# Patient Record
Sex: MALE | Race: WHITE | NOT HISPANIC OR LATINO | Employment: OTHER | URBAN - METROPOLITAN AREA
[De-identification: names, ages, dates, MRNs, and addresses within clinical notes are randomized per-mention and may not be internally consistent; named-entity substitution may affect disease eponyms.]

---

## 2023-04-27 LAB
ALANINE AMINOTRANSFERASE (SGPT) (U/L) IN SER/PLAS: 12 U/L (ref 10–52)
ALBUMIN (G/DL) IN SER/PLAS: 4.4 G/DL (ref 3.4–5)
ALBUMIN (MG/L) IN URINE: 21 MG/L
ALBUMIN/CREATININE (UG/MG) IN URINE: 9.7 UG/MG CRT (ref 0–30)
ALKALINE PHOSPHATASE (U/L) IN SER/PLAS: 67 U/L (ref 33–136)
ANION GAP IN SER/PLAS: 12 MMOL/L (ref 10–20)
APPEARANCE, URINE: NORMAL
ASPARTATE AMINOTRANSFERASE (SGOT) (U/L) IN SER/PLAS: 26 U/L (ref 9–39)
BASOPHILS (10*3/UL) IN BLOOD BY AUTOMATED COUNT: 0.13 X10E9/L (ref 0–0.1)
BASOPHILS/100 LEUKOCYTES IN BLOOD BY AUTOMATED COUNT: 1.2 % (ref 0–2)
BILIRUBIN TOTAL (MG/DL) IN SER/PLAS: 0.5 MG/DL (ref 0–1.2)
BILIRUBIN, URINE: NEGATIVE
BLOOD, URINE: NEGATIVE
CALCIUM (MG/DL) IN SER/PLAS: 9.6 MG/DL (ref 8.6–10.3)
CARBON DIOXIDE, TOTAL (MMOL/L) IN SER/PLAS: 27 MMOL/L (ref 21–32)
CHLORIDE (MMOL/L) IN SER/PLAS: 106 MMOL/L (ref 98–107)
CHOLESTEROL (MG/DL) IN SER/PLAS: 109 MG/DL (ref 0–199)
CHOLESTEROL IN HDL (MG/DL) IN SER/PLAS: 27 MG/DL
CHOLESTEROL/HDL RATIO: 4
COLOR, URINE: YELLOW
CREATININE (MG/DL) IN SER/PLAS: 0.77 MG/DL (ref 0.5–1.3)
CREATININE (MG/DL) IN URINE: 216 MG/DL (ref 20–370)
EOSINOPHILS (10*3/UL) IN BLOOD BY AUTOMATED COUNT: 0.98 X10E9/L (ref 0–0.7)
EOSINOPHILS/100 LEUKOCYTES IN BLOOD BY AUTOMATED COUNT: 9.1 % (ref 0–6)
ERYTHROCYTE DISTRIBUTION WIDTH (RATIO) BY AUTOMATED COUNT: 13.6 % (ref 11.5–14.5)
ERYTHROCYTE MEAN CORPUSCULAR HEMOGLOBIN CONCENTRATION (G/DL) BY AUTOMATED: 32.3 G/DL (ref 32–36)
ERYTHROCYTE MEAN CORPUSCULAR VOLUME (FL) BY AUTOMATED COUNT: 103 FL (ref 80–100)
ERYTHROCYTES (10*6/UL) IN BLOOD BY AUTOMATED COUNT: 4.8 X10E12/L (ref 4.5–5.9)
ESTIMATED AVERAGE GLUCOSE FOR HBA1C: 126 MG/DL
GFR MALE: >90 ML/MIN/1.73M2
GLUCOSE (MG/DL) IN SER/PLAS: 89 MG/DL (ref 74–99)
GLUCOSE, URINE: NEGATIVE MG/DL
HEMATOCRIT (%) IN BLOOD BY AUTOMATED COUNT: 49.3 % (ref 41–52)
HEMOGLOBIN (G/DL) IN BLOOD: 15.9 G/DL (ref 13.5–17.5)
HEMOGLOBIN A1C/HEMOGLOBIN TOTAL IN BLOOD: 6 %
IMMATURE GRANULOCYTES/100 LEUKOCYTES IN BLOOD BY AUTOMATED COUNT: 0.2 % (ref 0–0.9)
KETONES, URINE: NEGATIVE MG/DL
LDL: 63 MG/DL (ref 0–99)
LEUKOCYTE ESTERASE, URINE: NEGATIVE
LEUKOCYTES (10*3/UL) IN BLOOD BY AUTOMATED COUNT: 10.8 X10E9/L (ref 4.4–11.3)
LYMPHOCYTES (10*3/UL) IN BLOOD BY AUTOMATED COUNT: 3.11 X10E9/L (ref 1.2–4.8)
LYMPHOCYTES/100 LEUKOCYTES IN BLOOD BY AUTOMATED COUNT: 28.9 % (ref 13–44)
MONOCYTES (10*3/UL) IN BLOOD BY AUTOMATED COUNT: 0.75 X10E9/L (ref 0.1–1)
MONOCYTES/100 LEUKOCYTES IN BLOOD BY AUTOMATED COUNT: 7 % (ref 2–10)
NEUTROPHILS (10*3/UL) IN BLOOD BY AUTOMATED COUNT: 5.76 X10E9/L (ref 1.2–7.7)
NEUTROPHILS/100 LEUKOCYTES IN BLOOD BY AUTOMATED COUNT: 53.6 % (ref 40–80)
NITRITE, URINE: NEGATIVE
NRBC (PER 100 WBCS) BY AUTOMATED COUNT: 0 /100 WBC (ref 0–0)
PH, URINE: 5 (ref 5–8)
PLATELETS (10*3/UL) IN BLOOD AUTOMATED COUNT: 283 X10E9/L (ref 150–450)
POTASSIUM (MMOL/L) IN SER/PLAS: 4.7 MMOL/L (ref 3.5–5.3)
PROTEIN TOTAL: 7.4 G/DL (ref 6.4–8.2)
PROTEIN, URINE: NEGATIVE MG/DL
SODIUM (MMOL/L) IN SER/PLAS: 140 MMOL/L (ref 136–145)
SPECIFIC GRAVITY, URINE: 1.02 (ref 1–1.03)
THYROTROPIN (MIU/L) IN SER/PLAS BY DETECTION LIMIT <= 0.05 MIU/L: 5.02 MIU/L (ref 0.44–3.98)
TRIGLYCERIDE (MG/DL) IN SER/PLAS: 94 MG/DL (ref 0–149)
UREA NITROGEN (MG/DL) IN SER/PLAS: 15 MG/DL (ref 6–23)
UROBILINOGEN, URINE: <2 MG/DL (ref 0–1.9)
VLDL: 19 MG/DL (ref 0–40)

## 2023-09-27 DIAGNOSIS — I25.5 ISCHEMIC CARDIOMYOPATHY: ICD-10-CM

## 2023-10-20 ENCOUNTER — APPOINTMENT (OUTPATIENT)
Dept: RADIOLOGY | Facility: HOSPITAL | Age: 63
End: 2023-10-20
Payer: MEDICARE

## 2023-10-20 ENCOUNTER — HOSPITAL ENCOUNTER (OUTPATIENT)
Facility: HOSPITAL | Age: 63
Setting detail: OBSERVATION
Discharge: HOME | End: 2023-10-22
Attending: EMERGENCY MEDICINE | Admitting: INTERNAL MEDICINE
Payer: MEDICARE

## 2023-10-20 ENCOUNTER — HOSPITAL ENCOUNTER (OUTPATIENT)
Dept: CARDIOLOGY | Facility: HOSPITAL | Age: 63
Discharge: HOME | End: 2023-10-20
Payer: MEDICARE

## 2023-10-20 DIAGNOSIS — R79.89 ELEVATED TROPONIN: Primary | ICD-10-CM

## 2023-10-20 DIAGNOSIS — R07.89 CHEST DISCOMFORT: ICD-10-CM

## 2023-10-20 PROBLEM — R07.9 CHEST PAIN: Status: ACTIVE | Noted: 2023-10-20

## 2023-10-20 LAB
ALBUMIN SERPL BCP-MCNC: 4.8 G/DL (ref 3.4–5)
ALP SERPL-CCNC: 83 U/L (ref 33–136)
ALT SERPL W P-5'-P-CCNC: 9 U/L (ref 10–52)
ANION GAP SERPL CALC-SCNC: 22 MMOL/L (ref 10–20)
APPEARANCE UR: ABNORMAL
AST SERPL W P-5'-P-CCNC: 37 U/L (ref 9–39)
BASOPHILS # BLD AUTO: 0.08 X10*3/UL (ref 0–0.1)
BASOPHILS NFR BLD AUTO: 0.8 %
BILIRUB SERPL-MCNC: 0.9 MG/DL (ref 0–1.2)
BILIRUB UR STRIP.AUTO-MCNC: NEGATIVE MG/DL
BUN SERPL-MCNC: 24 MG/DL (ref 6–23)
CALCIUM SERPL-MCNC: 10.1 MG/DL (ref 8.6–10.3)
CARDIAC TROPONIN I PNL SERPL HS: 24 NG/L (ref 0–20)
CARDIAC TROPONIN I PNL SERPL HS: 48 NG/L (ref 0–20)
CHLORIDE SERPL-SCNC: 104 MMOL/L (ref 98–107)
CO2 SERPL-SCNC: 18 MMOL/L (ref 21–32)
COLOR UR: YELLOW
CREAT SERPL-MCNC: 1.09 MG/DL (ref 0.5–1.3)
EOSINOPHIL # BLD AUTO: 0.06 X10*3/UL (ref 0–0.7)
EOSINOPHIL NFR BLD AUTO: 0.6 %
ERYTHROCYTE [DISTWIDTH] IN BLOOD BY AUTOMATED COUNT: 14 % (ref 11.5–14.5)
FLUAV RNA RESP QL NAA+PROBE: NOT DETECTED
FLUBV RNA RESP QL NAA+PROBE: NOT DETECTED
GFR SERPL CREATININE-BSD FRML MDRD: 75 ML/MIN/1.73M*2
GLUCOSE BLD MANUAL STRIP-MCNC: 145 MG/DL (ref 74–99)
GLUCOSE BLD MANUAL STRIP-MCNC: 155 MG/DL (ref 74–99)
GLUCOSE SERPL-MCNC: 125 MG/DL (ref 74–99)
GLUCOSE UR STRIP.AUTO-MCNC: ABNORMAL MG/DL
HCT VFR BLD AUTO: 48.2 % (ref 41–52)
HGB BLD-MCNC: 16.3 G/DL (ref 13.5–17.5)
HYALINE CASTS #/AREA URNS AUTO: ABNORMAL /LPF
IMM GRANULOCYTES # BLD AUTO: 0.04 X10*3/UL (ref 0–0.7)
IMM GRANULOCYTES NFR BLD AUTO: 0.4 % (ref 0–0.9)
KETONES UR STRIP.AUTO-MCNC: ABNORMAL MG/DL
LACTATE SERPL-SCNC: 2.8 MMOL/L (ref 0.4–2)
LACTATE SERPL-SCNC: 3.3 MMOL/L (ref 0.4–2)
LEUKOCYTE ESTERASE UR QL STRIP.AUTO: NEGATIVE
LIPASE SERPL-CCNC: 22 U/L (ref 9–82)
LYMPHOCYTES # BLD AUTO: 1.58 X10*3/UL (ref 1.2–4.8)
LYMPHOCYTES NFR BLD AUTO: 15.3 %
MAGNESIUM SERPL-MCNC: 1.76 MG/DL (ref 1.6–2.4)
MCH RBC QN AUTO: 32.1 PG (ref 26–34)
MCHC RBC AUTO-ENTMCNC: 33.8 G/DL (ref 32–36)
MCV RBC AUTO: 95 FL (ref 80–100)
MONOCYTES # BLD AUTO: 1.44 X10*3/UL (ref 0.1–1)
MONOCYTES NFR BLD AUTO: 14 %
MUCOUS THREADS #/AREA URNS AUTO: ABNORMAL /LPF
NEUTROPHILS # BLD AUTO: 7.1 X10*3/UL (ref 1.2–7.7)
NEUTROPHILS NFR BLD AUTO: 68.9 %
NITRITE UR QL STRIP.AUTO: NEGATIVE
NRBC BLD-RTO: 0 /100 WBCS (ref 0–0)
PH UR STRIP.AUTO: 5.5 [PH]
PLATELET # BLD AUTO: 278 X10*3/UL (ref 150–450)
PMV BLD AUTO: 9.6 FL (ref 7.5–11.5)
POTASSIUM SERPL-SCNC: 4.3 MMOL/L (ref 3.5–5.3)
PROT SERPL-MCNC: 8.1 G/DL (ref 6.4–8.2)
PROT UR STRIP.AUTO-MCNC: ABNORMAL MG/DL
RBC # BLD AUTO: 5.07 X10*6/UL (ref 4.5–5.9)
RBC # UR STRIP.AUTO: ABNORMAL /UL
RBC #/AREA URNS AUTO: ABNORMAL /HPF
SARS-COV-2 RNA RESP QL NAA+PROBE: NOT DETECTED
SODIUM SERPL-SCNC: 140 MMOL/L (ref 136–145)
SP GR UR STRIP.AUTO: 1.02
UROBILINOGEN UR STRIP.AUTO-MCNC: ABNORMAL MG/DL
WBC # BLD AUTO: 10.3 X10*3/UL (ref 4.4–11.3)
WBC #/AREA URNS AUTO: ABNORMAL /HPF

## 2023-10-20 PROCEDURE — 36415 COLL VENOUS BLD VENIPUNCTURE: CPT | Performed by: NURSE PRACTITIONER

## 2023-10-20 PROCEDURE — 87636 SARSCOV2 & INF A&B AMP PRB: CPT

## 2023-10-20 PROCEDURE — 2550000001 HC RX 255 CONTRASTS: Performed by: NURSE PRACTITIONER

## 2023-10-20 PROCEDURE — 83605 ASSAY OF LACTIC ACID: CPT | Performed by: NURSE PRACTITIONER

## 2023-10-20 PROCEDURE — 93005 ELECTROCARDIOGRAM TRACING: CPT

## 2023-10-20 PROCEDURE — 83690 ASSAY OF LIPASE: CPT | Performed by: NURSE PRACTITIONER

## 2023-10-20 PROCEDURE — 82947 ASSAY GLUCOSE BLOOD QUANT: CPT

## 2023-10-20 PROCEDURE — 83735 ASSAY OF MAGNESIUM: CPT | Performed by: NURSE PRACTITIONER

## 2023-10-20 PROCEDURE — 87075 CULTR BACTERIA EXCEPT BLOOD: CPT | Mod: CMCLAB,PARLAB,59 | Performed by: NURSE PRACTITIONER

## 2023-10-20 PROCEDURE — 74177 CT ABD & PELVIS W/CONTRAST: CPT | Mod: ME

## 2023-10-20 PROCEDURE — 80053 COMPREHEN METABOLIC PANEL: CPT | Performed by: NURSE PRACTITIONER

## 2023-10-20 PROCEDURE — 81001 URINALYSIS AUTO W/SCOPE: CPT | Performed by: NURSE PRACTITIONER

## 2023-10-20 PROCEDURE — 2500000001 HC RX 250 WO HCPCS SELF ADMINISTERED DRUGS (ALT 637 FOR MEDICARE OP)

## 2023-10-20 PROCEDURE — 71046 X-RAY EXAM CHEST 2 VIEWS: CPT | Mod: FOREIGN READ | Performed by: RADIOLOGY

## 2023-10-20 PROCEDURE — 87040 BLOOD CULTURE FOR BACTERIA: CPT | Mod: CMCLAB,PARLAB | Performed by: NURSE PRACTITIONER

## 2023-10-20 PROCEDURE — 2500000004 HC RX 250 GENERAL PHARMACY W/ HCPCS (ALT 636 FOR OP/ED)

## 2023-10-20 PROCEDURE — 87040 BLOOD CULTURE FOR BACTERIA: CPT | Mod: 59,CMCLAB,PARLAB | Performed by: NURSE PRACTITIONER

## 2023-10-20 PROCEDURE — G0378 HOSPITAL OBSERVATION PER HR: HCPCS

## 2023-10-20 PROCEDURE — 84484 ASSAY OF TROPONIN QUANT: CPT

## 2023-10-20 PROCEDURE — 85025 COMPLETE CBC W/AUTO DIFF WBC: CPT | Performed by: NURSE PRACTITIONER

## 2023-10-20 PROCEDURE — 74177 CT ABD & PELVIS W/CONTRAST: CPT | Mod: FOREIGN READ | Performed by: RADIOLOGY

## 2023-10-20 PROCEDURE — 99285 EMERGENCY DEPT VISIT HI MDM: CPT | Mod: 25 | Performed by: EMERGENCY MEDICINE

## 2023-10-20 PROCEDURE — 96360 HYDRATION IV INFUSION INIT: CPT

## 2023-10-20 PROCEDURE — 71046 X-RAY EXAM CHEST 2 VIEWS: CPT | Mod: FY,FR

## 2023-10-20 PROCEDURE — 84484 ASSAY OF TROPONIN QUANT: CPT | Mod: 59 | Performed by: NURSE PRACTITIONER

## 2023-10-20 RX ORDER — ACETAMINOPHEN 325 MG/1
650 TABLET ORAL ONCE
Status: COMPLETED | OUTPATIENT
Start: 2023-10-20 | End: 2023-10-20

## 2023-10-20 RX ORDER — ASPIRIN 325 MG
325 TABLET ORAL ONCE
Status: COMPLETED | OUTPATIENT
Start: 2023-10-20 | End: 2023-10-20

## 2023-10-20 RX ADMIN — IOHEXOL 75 ML: 350 INJECTION, SOLUTION INTRAVENOUS at 19:59

## 2023-10-20 RX ADMIN — ASPIRIN 325 MG ORAL TABLET 325 MG: 325 PILL ORAL at 23:04

## 2023-10-20 RX ADMIN — SODIUM CHLORIDE, POTASSIUM CHLORIDE, SODIUM LACTATE AND CALCIUM CHLORIDE 1000 ML: 600; 310; 30; 20 INJECTION, SOLUTION INTRAVENOUS at 21:15

## 2023-10-20 RX ADMIN — ACETAMINOPHEN 650 MG: 325 TABLET ORAL at 21:54

## 2023-10-20 ASSESSMENT — HEART SCORE
HISTORY: SLIGHTLY SUSPICIOUS
HEART SCORE: 5
RISK FACTORS: >2 RISK FACTORS OR HX OF ATHEROSCLEROTIC DISEASE
ECG: NORMAL
TROPONIN: 1-3 TIMES NORMAL LIMIT
AGE: 65+

## 2023-10-20 ASSESSMENT — COLUMBIA-SUICIDE SEVERITY RATING SCALE - C-SSRS
2. HAVE YOU ACTUALLY HAD ANY THOUGHTS OF KILLING YOURSELF?: NO
6. HAVE YOU EVER DONE ANYTHING, STARTED TO DO ANYTHING, OR PREPARED TO DO ANYTHING TO END YOUR LIFE?: NO
1. IN THE PAST MONTH, HAVE YOU WISHED YOU WERE DEAD OR WISHED YOU COULD GO TO SLEEP AND NOT WAKE UP?: NO

## 2023-10-20 NOTE — ED TRIAGE NOTES
Pt wife requested repeat vitals on patient. Pt shaking uncontrollably stating he needs a room asap. RN informed patient and wife that there are physically no available beds in the department at this time. RN informed charge nurse.

## 2023-10-20 NOTE — ED TRIAGE NOTES
PIT Note    Secondary to patient volumes and overcrowding, I performed a brief medical screening exam of the patient in triage, as the patient awaits space in the main ED.    History of Present Illness: Max Rivera is a 63-year-old  male with history of TIA, coronary artery disease with prior MI and CABG, diabetes and anxiety who presents to ED today from home with wife for evaluation of central abdominal pain and nausea/vomiting x3 days, unable to keep anything down.  No medication taken prior to arrival.  Sharp stabbing central abdominal pain is rated 7/10, nothing palliative provokes his symptoms.  Unable to take any of his medications due to nausea/vomiting.  Wife reports intermittent fevers.  No sick contacts, tainted food or recent travel.  No previous abdominal surgeries.  Denies cough/cold symptoms, chest pain, shortness of breath, dysuria/hematuria, change in bowel habits or any other complaints.  Former smoker, no EtOH or drug use.  PCP is Dr. Kamran Tejada, cardiologist is Dr. Benitez.     Physical Exam:  General -older  male, sitting in a wheelchair shaking, complaining of nausea, nontoxic looking but appears unwell.  Alert and oriented x3.  Cooperative.  Lips dry.  Thin.  Respiratory - Breathing comfortably  Cardiac -regular rate and rhythm.  Abdominal -abdomen is flat and soft with bowel sounds, diffusely tender.  Neurologic -movement of extremities x4.  No focal neurological deficit.  Skin -Pink warm and dry.      Medical Decision Makin-year-old  male evaluated the bedside for 3 days of nausea/vomiting and abdominal pain.  On arrival to the ED, blood sugar 145.  Blood pressure 119/63 with a heart rate of 88, afebrile now, O2 sat 98%.  Abdomen is generally tender.  Remains NPO.  IV established, basic labs, UA/urine culture, EKG, chest x-ray and CT abdomen/pelvis with contrast will be performed in preparation for further evaluation in the main ED.  Patient and wife  agreeable to this plan.      The patient demonstrates understanding that this initial evaluation is a brief medical screening exam and the expectation is that they await for space in the main ED to be further evaluated.  The patient understands that, if they leave prior to further evaluation in the main ED after this initial evaluation in triage, they are doing so under their own accord knowing that their evaluation/work-up is not yet complete. The patient also understands that any preliminary diagnostic results, including abnormalities, may not be shared with them, if they choose to leave prior to further evaluation in the main ED.

## 2023-10-20 NOTE — ED TRIAGE NOTES
Pt presents to ED from home for reports of chest congestion, weakness, shakiness, and cough x3 days. Pt denies diarrhea, CP, SOB. Pt reports vomiting.

## 2023-10-21 LAB
ALBUMIN SERPL BCP-MCNC: 4.2 G/DL (ref 3.4–5)
ALP SERPL-CCNC: 65 U/L (ref 33–136)
ALT SERPL W P-5'-P-CCNC: 8 U/L (ref 10–52)
ANION GAP SERPL CALC-SCNC: 13 MMOL/L (ref 10–20)
AST SERPL W P-5'-P-CCNC: 73 U/L (ref 9–39)
BILIRUB SERPL-MCNC: 0.6 MG/DL (ref 0–1.2)
BUN SERPL-MCNC: 21 MG/DL (ref 6–23)
CALCIUM SERPL-MCNC: 9.1 MG/DL (ref 8.6–10.3)
CARDIAC TROPONIN I PNL SERPL HS: 85 NG/L (ref 0–20)
CARDIAC TROPONIN I PNL SERPL HS: 86 NG/L (ref 0–20)
CHLORIDE SERPL-SCNC: 110 MMOL/L (ref 98–107)
CO2 SERPL-SCNC: 24 MMOL/L (ref 21–32)
CREAT SERPL-MCNC: 0.9 MG/DL (ref 0.5–1.3)
GFR SERPL CREATININE-BSD FRML MDRD: >90 ML/MIN/1.73M*2
GLUCOSE SERPL-MCNC: 98 MG/DL (ref 74–99)
HOLD SPECIMEN: NORMAL
POTASSIUM SERPL-SCNC: 3.8 MMOL/L (ref 3.5–5.3)
PROT SERPL-MCNC: 7.4 G/DL (ref 6.4–8.2)
SODIUM SERPL-SCNC: 143 MMOL/L (ref 136–145)

## 2023-10-21 PROCEDURE — 80053 COMPREHEN METABOLIC PANEL: CPT | Performed by: INTERNAL MEDICINE

## 2023-10-21 PROCEDURE — 2500000002 HC RX 250 W HCPCS SELF ADMINISTERED DRUGS (ALT 637 FOR MEDICARE OP, ALT 636 FOR OP/ED): Mod: MUE | Performed by: INTERNAL MEDICINE

## 2023-10-21 PROCEDURE — 2500000004 HC RX 250 GENERAL PHARMACY W/ HCPCS (ALT 636 FOR OP/ED): Mod: MUE | Performed by: INTERNAL MEDICINE

## 2023-10-21 PROCEDURE — 36415 COLL VENOUS BLD VENIPUNCTURE: CPT | Performed by: INTERNAL MEDICINE

## 2023-10-21 PROCEDURE — G0378 HOSPITAL OBSERVATION PER HR: HCPCS

## 2023-10-21 PROCEDURE — 84484 ASSAY OF TROPONIN QUANT: CPT | Mod: 59 | Performed by: INTERNAL MEDICINE

## 2023-10-21 PROCEDURE — 96372 THER/PROPH/DIAG INJ SC/IM: CPT | Performed by: INTERNAL MEDICINE

## 2023-10-21 PROCEDURE — 2500000001 HC RX 250 WO HCPCS SELF ADMINISTERED DRUGS (ALT 637 FOR MEDICARE OP): Performed by: INTERNAL MEDICINE

## 2023-10-21 PROCEDURE — 99222 1ST HOSP IP/OBS MODERATE 55: CPT | Performed by: INTERNAL MEDICINE

## 2023-10-21 RX ORDER — GABAPENTIN 400 MG/1
400 CAPSULE ORAL 3 TIMES DAILY
COMMUNITY

## 2023-10-21 RX ORDER — SERTRALINE HYDROCHLORIDE 100 MG/1
100 TABLET, FILM COATED ORAL DAILY
Status: DISCONTINUED | OUTPATIENT
Start: 2023-10-21 | End: 2023-10-22 | Stop reason: HOSPADM

## 2023-10-21 RX ORDER — ATORVASTATIN CALCIUM 80 MG/1
80 TABLET, FILM COATED ORAL DAILY
COMMUNITY
End: 2024-05-28

## 2023-10-21 RX ORDER — TIZANIDINE 4 MG/1
2 TABLET ORAL EVERY 8 HOURS PRN
Status: DISCONTINUED | OUTPATIENT
Start: 2023-10-21 | End: 2023-10-22 | Stop reason: HOSPADM

## 2023-10-21 RX ORDER — SERTRALINE HYDROCHLORIDE 100 MG/1
1 TABLET, FILM COATED ORAL DAILY
COMMUNITY
Start: 2023-08-29

## 2023-10-21 RX ORDER — ATORVASTATIN CALCIUM 80 MG/1
80 TABLET, FILM COATED ORAL DAILY
Status: DISCONTINUED | OUTPATIENT
Start: 2023-10-21 | End: 2023-10-22 | Stop reason: HOSPADM

## 2023-10-21 RX ORDER — EZETIMIBE 10 MG/1
10 TABLET ORAL DAILY
COMMUNITY

## 2023-10-21 RX ORDER — VIT C/E/ZN/COPPR/LUTEIN/ZEAXAN 250MG-90MG
25 CAPSULE ORAL DAILY
COMMUNITY
Start: 2022-05-31

## 2023-10-21 RX ORDER — CARVEDILOL 3.12 MG/1
3.12 TABLET ORAL
COMMUNITY
Start: 2017-03-20

## 2023-10-21 RX ORDER — ASPIRIN 81 MG/1
1 TABLET ORAL DAILY
COMMUNITY

## 2023-10-21 RX ORDER — CARVEDILOL 3.12 MG/1
3.12 TABLET ORAL
Status: DISCONTINUED | OUTPATIENT
Start: 2023-10-22 | End: 2023-10-22 | Stop reason: HOSPADM

## 2023-10-21 RX ORDER — VIT C/E/ZN/COPPR/LUTEIN/ZEAXAN 250MG-90MG
25 CAPSULE ORAL DAILY
Status: DISCONTINUED | OUTPATIENT
Start: 2023-10-22 | End: 2023-10-22

## 2023-10-21 RX ORDER — LORATADINE 10 MG/1
10 TABLET ORAL DAILY
Status: DISCONTINUED | OUTPATIENT
Start: 2023-10-21 | End: 2023-10-22 | Stop reason: HOSPADM

## 2023-10-21 RX ORDER — ASPIRIN 81 MG/1
81 TABLET ORAL DAILY
Status: DISCONTINUED | OUTPATIENT
Start: 2023-10-21 | End: 2023-10-22 | Stop reason: HOSPADM

## 2023-10-21 RX ORDER — EZETIMIBE 10 MG/1
10 TABLET ORAL DAILY
Status: DISCONTINUED | OUTPATIENT
Start: 2023-10-21 | End: 2023-10-22 | Stop reason: HOSPADM

## 2023-10-21 RX ORDER — ENOXAPARIN SODIUM 100 MG/ML
40 INJECTION SUBCUTANEOUS EVERY 24 HOURS
Status: DISCONTINUED | OUTPATIENT
Start: 2023-10-21 | End: 2023-10-22 | Stop reason: HOSPADM

## 2023-10-21 RX ORDER — GABAPENTIN 400 MG/1
400 CAPSULE ORAL 3 TIMES DAILY
Status: DISCONTINUED | OUTPATIENT
Start: 2023-10-21 | End: 2023-10-22 | Stop reason: HOSPADM

## 2023-10-21 RX ORDER — TIZANIDINE 2 MG/1
2 TABLET ORAL EVERY 8 HOURS PRN
COMMUNITY
Start: 2023-08-29

## 2023-10-21 RX ADMIN — ATORVASTATIN CALCIUM 80 MG: 80 TABLET, FILM COATED ORAL at 19:46

## 2023-10-21 RX ADMIN — EZETIMIBE 10 MG: 10 TABLET ORAL at 19:46

## 2023-10-21 RX ADMIN — LORATADINE 10 MG: 10 TABLET ORAL at 20:46

## 2023-10-21 RX ADMIN — TIZANIDINE 2 MG: 4 TABLET ORAL at 19:46

## 2023-10-21 RX ADMIN — EMPAGLIFLOZIN 10 MG: 10 TABLET, FILM COATED ORAL at 20:46

## 2023-10-21 RX ADMIN — ENOXAPARIN SODIUM 40 MG: 40 INJECTION SUBCUTANEOUS at 20:46

## 2023-10-21 RX ADMIN — ASPIRIN 81 MG: 81 TABLET, COATED ORAL at 19:46

## 2023-10-21 RX ADMIN — SERTRALINE HYDROCHLORIDE 100 MG: 100 TABLET ORAL at 19:46

## 2023-10-21 RX ADMIN — GABAPENTIN 400 MG: 400 CAPSULE ORAL at 20:51

## 2023-10-21 SDOH — SOCIAL STABILITY: SOCIAL INSECURITY: HAS ANYONE EVER THREATENED TO HURT YOUR FAMILY OR YOUR PETS?: NO

## 2023-10-21 SDOH — SOCIAL STABILITY: SOCIAL INSECURITY: ARE YOU OR HAVE YOU BEEN THREATENED OR ABUSED PHYSICALLY, EMOTIONALLY, OR SEXUALLY BY ANYONE?: NO

## 2023-10-21 SDOH — SOCIAL STABILITY: SOCIAL INSECURITY: HAVE YOU HAD THOUGHTS OF HARMING ANYONE ELSE?: NO

## 2023-10-21 SDOH — SOCIAL STABILITY: SOCIAL INSECURITY: ABUSE: ADULT

## 2023-10-21 SDOH — SOCIAL STABILITY: SOCIAL INSECURITY: DOES ANYONE TRY TO KEEP YOU FROM HAVING/CONTACTING OTHER FRIENDS OR DOING THINGS OUTSIDE YOUR HOME?: NO

## 2023-10-21 SDOH — SOCIAL STABILITY: SOCIAL INSECURITY: DO YOU FEEL UNSAFE GOING BACK TO THE PLACE WHERE YOU ARE LIVING?: NO

## 2023-10-21 SDOH — SOCIAL STABILITY: SOCIAL INSECURITY: DO YOU FEEL ANYONE HAS EXPLOITED OR TAKEN ADVANTAGE OF YOU FINANCIALLY OR OF YOUR PERSONAL PROPERTY?: NO

## 2023-10-21 SDOH — SOCIAL STABILITY: SOCIAL INSECURITY: WERE YOU ABLE TO COMPLETE ALL THE BEHAVIORAL HEALTH SCREENINGS?: YES

## 2023-10-21 SDOH — SOCIAL STABILITY: SOCIAL INSECURITY: ARE THERE ANY APPARENT SIGNS OF INJURIES/BEHAVIORS THAT COULD BE RELATED TO ABUSE/NEGLECT?: NO

## 2023-10-21 ASSESSMENT — PAIN DESCRIPTION - PROGRESSION: CLINICAL_PROGRESSION: GRADUALLY IMPROVING

## 2023-10-21 ASSESSMENT — LIFESTYLE VARIABLES
SUBSTANCE_ABUSE_PAST_12_MONTHS: YES
REASON UNABLE TO ASSESS: NO
HOW OFTEN DO YOU HAVE A DRINK CONTAINING ALCOHOL: NEVER
SKIP TO QUESTIONS 9-10: 1
PRESCIPTION_ABUSE_PAST_12_MONTHS: NO
AUDIT-C TOTAL SCORE: 0
HOW OFTEN DO YOU HAVE 6 OR MORE DRINKS ON ONE OCCASION: NEVER
HAVE PEOPLE ANNOYED YOU BY CRITICIZING YOUR DRINKING: NO
EVER FELT BAD OR GUILTY ABOUT YOUR DRINKING: NO
EVER HAD A DRINK FIRST THING IN THE MORNING TO STEADY YOUR NERVES TO GET RID OF A HANGOVER: NO
HOW MANY STANDARD DRINKS CONTAINING ALCOHOL DO YOU HAVE ON A TYPICAL DAY: PATIENT DOES NOT DRINK
HAVE YOU EVER FELT YOU SHOULD CUT DOWN ON YOUR DRINKING: NO
AUDIT-C TOTAL SCORE: 0

## 2023-10-21 ASSESSMENT — COGNITIVE AND FUNCTIONAL STATUS - GENERAL
DAILY ACTIVITIY SCORE: 24
MOBILITY SCORE: 24
PATIENT BASELINE BEDBOUND: NO

## 2023-10-21 ASSESSMENT — ACTIVITIES OF DAILY LIVING (ADL)
PATIENT'S MEMORY ADEQUATE TO SAFELY COMPLETE DAILY ACTIVITIES?: YES
GROOMING: INDEPENDENT
HEARING - RIGHT EAR: FUNCTIONAL
JUDGMENT_ADEQUATE_SAFELY_COMPLETE_DAILY_ACTIVITIES: YES
HEARING - LEFT EAR: FUNCTIONAL
TOILETING: INDEPENDENT
LACK_OF_TRANSPORTATION: NO
FEEDING YOURSELF: INDEPENDENT
ADEQUATE_TO_COMPLETE_ADL: YES
WALKS IN HOME: INDEPENDENT
DRESSING YOURSELF: INDEPENDENT
BATHING: INDEPENDENT

## 2023-10-21 ASSESSMENT — PAIN - FUNCTIONAL ASSESSMENT: PAIN_FUNCTIONAL_ASSESSMENT: 0-10

## 2023-10-21 ASSESSMENT — PATIENT HEALTH QUESTIONNAIRE - PHQ9
2. FEELING DOWN, DEPRESSED OR HOPELESS: NOT AT ALL
1. LITTLE INTEREST OR PLEASURE IN DOING THINGS: NOT AT ALL
SUM OF ALL RESPONSES TO PHQ9 QUESTIONS 1 & 2: 0

## 2023-10-21 ASSESSMENT — PAIN SCALES - GENERAL: PAINLEVEL_OUTOF10: 0 - NO PAIN

## 2023-10-21 NOTE — PROGRESS NOTES
Pharmacy Medication History Review    Max Rivera is a 63 y.o. male admitted for Chest pain. Pharmacy reviewed the patient's [id number] medications and allergies for accuracy.    The list below reflectives the updated PTA list. Please review each medication in order reconciliation for additional clarification and justification.  (Not in a hospital admission)       The list below reflectives the updated allergy list. Please review each documented allergy for additional clarification and justification.  Allergies  Reviewed by Marta Mckeon CPhT on 10/21/2023        Severity Reactions Comments    Penicillin Medium Unknown Childhood allergy            Below are additional concerns with the patient's PTA list.  See PTA med list    Marta Mckeon CPhT

## 2023-10-21 NOTE — ED PROVIDER NOTES
EMERGENCY DEPARTMENT ENCOUNTER      Pt Name: Max Rivera  MRN: [id number]  Birthdate 1960  Date of evaluation: 10/20/2023  Provider: Souleymane Goodson DO    CHIEF COMPLAINT     No chief complaint on file.        HISTORY OF PRESENT ILLNESS    63-year-old male with a history of coronary disease status post CABG, TIA comes to the emergency room.  He has had 5 episodes of nonbloody emesis, chills, fatigue, body aches, cough, congestion since Wednesday.  Denies any chest pain, shortness of breath.  He also did complain of some left-sided lower abdominal pain while in triage but is currently asymptomatic in terms of abdominal pain.  No black or red stools, no urinary symptoms.  No other symptoms today.  He did receive his COVID, influenza, RSV vaccine last week.      History provided by:  Patient      Nursing Notes were reviewed.    PAST MEDICAL HISTORY     Past Medical History:   Diagnosis Date    Anxiety disorder, unspecified     Anxiety and depression    Diabetes mellitus (CMS/HCC)     Old myocardial infarction     History of myocardial infarction    Personal history of other diseases of the digestive system     History of gastrointestinal hemorrhage    Personal history of other endocrine, nutritional and metabolic disease     History of hyperlipidemia    Sleep apnea, unspecified 08/05/2022    Sleep apnea, unspecified type    Transient cerebral ischemic attack, unspecified     TIA (transient ischemic attack)         SURGICAL HISTORY       Past Surgical History:   Procedure Laterality Date    CATARACT EXTRACTION  08/11/2016    Cataract Surgery    LUMBAR LAMINECTOMY  08/11/2016    Laminectomy Lumbar    OTHER SURGICAL HISTORY  09/14/2021    Previous Stent Placement    OTHER SURGICAL HISTORY  02/07/2019    Tooth extraction    OTHER SURGICAL HISTORY  02/07/2019    Tonsillectomy    OTHER SURGICAL HISTORY  02/07/2019    Parotidectomy    OTHER SURGICAL HISTORY  02/07/2019    Back surgery    OTHER SURGICAL HISTORY   09/14/2021    Coronary Artery Quadruple Venous Bypass Graft    OTHER SURGICAL HISTORY  05/27/2022    Cardiac catheterization    OTHER SURGICAL HISTORY  06/20/2019    Colonoscopy         CURRENT MEDICATIONS       Previous Medications    No medications on file       ALLERGIES     Penicillin g    FAMILY HISTORY     No family history on file.       SOCIAL HISTORY       Social History     Socioeconomic History    Marital status:      Spouse name: None    Number of children: None    Years of education: None    Highest education level: None   Occupational History    None   Tobacco Use    Smoking status: Never    Smokeless tobacco: Never   Substance and Sexual Activity    Alcohol use: Never    Drug use: Never    Sexual activity: None   Other Topics Concern    None   Social History Narrative    None     Social Determinants of Health     Financial Resource Strain: Not on file   Food Insecurity: Not on file   Transportation Needs: Not on file   Physical Activity: Not on file   Stress: Not on file   Social Connections: Not on file   Intimate Partner Violence: Not on file   Housing Stability: Not on file       SCREENINGS                        PHYSICAL EXAM    (up to 7 for level 4, 8 or more for level 5)     ED Triage Vitals [10/20/23 1207]   Temp Heart Rate Resp BP   36.5 °C (97.7 °F) 88 20 119/63      SpO2 Temp Source Heart Rate Source Patient Position   98 % Tympanic Monitor Sitting      BP Location FiO2 (%)     Left arm --       Physical Exam  Vitals and nursing note reviewed.   Constitutional:       General: He is not in acute distress.  HENT:      Head: Normocephalic and atraumatic.   Eyes:      General: No scleral icterus.        Right eye: No discharge.         Left eye: No discharge.      Conjunctiva/sclera: Conjunctivae normal.   Cardiovascular:      Rate and Rhythm: Normal rate and regular rhythm.      Pulses: Normal pulses.   Pulmonary:      Effort: Pulmonary effort is normal.   Abdominal:      General:  Abdomen is flat.      Palpations: Abdomen is soft.      Tenderness: There is no abdominal tenderness. There is no guarding or rebound.   Musculoskeletal:         General: No deformity.      Right lower leg: No edema.      Left lower leg: No edema.   Skin:     General: Skin is warm and dry.   Neurological:      Mental Status: He is alert and oriented to person, place, and time. Mental status is at baseline.   Psychiatric:         Mood and Affect: Mood normal.         Behavior: Behavior normal.          DIAGNOSTIC RESULTS     LABS:  Labs Reviewed   CBC WITH AUTO DIFFERENTIAL - Abnormal       Result Value    WBC 10.3      nRBC 0.0      RBC 5.07      Hemoglobin 16.3      Hematocrit 48.2      MCV 95      MCH 32.1      MCHC 33.8      RDW 14.0      Platelets 278      MPV 9.6      Neutrophils % 68.9      Immature Granulocytes %, Automated 0.4      Lymphocytes % 15.3      Monocytes % 14.0      Eosinophils % 0.6      Basophils % 0.8      Neutrophils Absolute 7.10      Immature Granulocytes Absolute, Automated 0.04      Lymphocytes Absolute 1.58      Monocytes Absolute 1.44 (*)     Eosinophils Absolute 0.06      Basophils Absolute 0.08     COMPREHENSIVE METABOLIC PANEL - Abnormal    Glucose 125 (*)     Sodium 140      Potassium 4.3      Chloride 104      Bicarbonate 18 (*)     Anion Gap 22 (*)     Urea Nitrogen 24 (*)     Creatinine 1.09      eGFR 75      Calcium 10.1      Albumin 4.8      Alkaline Phosphatase 83      Total Protein 8.1      AST 37      Bilirubin, Total 0.9      ALT 9 (*)    LACTATE - Abnormal    Lactate 3.3 (*)     Narrative:     Venipuncture immediately after or during the administration of Metamizole may lead to falsely low results. Testing should be performed immediately  prior to Metamizole dosing.   TROPONIN I, HIGH SENSITIVITY - Abnormal    Troponin I, High Sensitivity 24 (*)     Narrative:     Less than 99th percentile of normal range cutoff-  Female and children under 18 years old <14 ng/L; Male <21  ng/L: Negative  Repeat testing should be performed if clinically indicated.     Female and children under 18 years old 14-50 ng/L; Male 21-50 ng/L:  Consistent with possible cardiac damage and possible increased clinical   risk. Serial measurements may help to assess extent of myocardial damage.     >50 ng/L: Consistent with cardiac damage, increased clinical risk and  myocardial infarction. Serial measurements may help assess extent of   myocardial damage.      NOTE: Children less than 1 year old may have higher baseline troponin   levels and results should be interpreted in conjunction with the overall   clinical context.     NOTE: Troponin I testing is performed using a different   testing methodology at [facility] than at other   [facility]. Direct result comparisons should only   be made within the same method.   LACTATE - Abnormal    Lactate 2.8 (*)     Narrative:     Venipuncture immediately after or during the administration of Metamizole may lead to falsely low results. Testing should be performed immediately  prior to Metamizole dosing.   TROPONIN I, HIGH SENSITIVITY - Abnormal    Troponin I, High Sensitivity 48 (*)     Narrative:     Less than 99th percentile of normal range cutoff-  Female and children under 18 years old <14 ng/L; Male <21 ng/L: Negative  Repeat testing should be performed if clinically indicated.     Female and children under 18 years old 14-50 ng/L; Male 21-50 ng/L:  Consistent with possible cardiac damage and possible increased clinical   risk. Serial measurements may help to assess extent of myocardial damage.     >50 ng/L: Consistent with cardiac damage, increased clinical risk and  myocardial infarction. Serial measurements may help assess extent of   myocardial damage.      NOTE: Children less than 1 year old may have higher baseline troponin   levels and results should be interpreted in conjunction with the overall   clinical context.     NOTE: Troponin I  testing is performed using a different   testing methodology at [facility] than at other   [facility]. Direct result comparisons should only   be made within the same method.   POCT GLUCOSE - Abnormal    POCT Glucose 145 (*)    POCT GLUCOSE - Abnormal    POCT Glucose 155 (*)    BLOOD CULTURE - Normal    Blood Culture Loaded on Instrument - Culture in progress     MAGNESIUM - Normal    Magnesium 1.76     LIPASE - Normal    Lipase 22      Narrative:     Venipuncture immediately after or during the administration of Metamizole may lead to falsely low results. Testing should be performed immediately prior to Metamizole dosing.   INFLUENZA A AND B PCR - Normal    Flu A Result Not Detected      Flu B Result Not Detected      Narrative:     This assay is an in vitro diagnostic multiplex nucleic acid amplification test for the detection and discrimination of Influenza A & B from nasopharyngeal specimens, and has been validated for use at [facility]. Negative results do not preclude Influenza A/B infections, and should not be used as the sole basis for diagnosis, treatment, or other management decisions. If Influenza A/B and RSV PCR results are negative, testing for Parainfluenza virus, Adenovirus and Metapneumovirus is routinely performed for [facility] and intensive care inpatients, and is available on other patients by placing an add-on request.   SARS-COV-2 PCR, SYMPTOMATIC - Normal    Coronavirus 2019, PCR Not Detected      Narrative:     This assay has received FDA Emergency Use Authorization (EUA) and is only authorized for the duration of time that circumstances exist to justify the authorization of the emergency use of in vitro diagnostic tests for the detection of SARS-CoV-2 virus and/or diagnosis of COVID-19 infection under section 564(b)(1) of the Act, 21 U.S.C. 360bbb-3(b)(1). This assay is an in vitro diagnostic nucleic acid amplification test for the  qualitative detection of SARS-CoV-2 from nasopharyngeal specimens and has been validated for use at [facility]. Negative results do not preclude COVID-19 infections and should not be used as the sole basis for diagnosis, treatment, or other management decisions.     BLOOD CULTURE   URINALYSIS WITH REFLEX MICROSCOPIC AND CULTURE    Narrative:     The following orders were created for panel order Urinalysis with Reflex Microscopic and Culture.  Procedure                               Abnormality         Status                     ---------                               -----------         ------                     Urinalysis with Reflex M...[[id number]]                      In process                 Extra Urine Gray Tube[[id number]]                            In process                   Please view results for these tests on the individual orders.   URINALYSIS WITH REFLEX MICROSCOPIC AND CULTURE   EXTRA URINE GRAY TUBE   URINALYSIS MICROSCOPIC WITH REFLEX CULTURE       All other labs were within normal range or not returned as of this dictation.    Imaging  CT abdomen pelvis w IV contrast   Final Result   1.No acute intra-abdominal or pelvic abnormalities. No bowel   obstruction, free fluid, or free air. Normal appendix.   2.Mildly distended gallbladder with no acute pericholecystic   abnormalities.   3.Scattered colonic diverticula without evidence of   diverticulitis.   Signed by Maria L Liang DO      XR chest 2 views   Final Result   No focal regions of airspace consolidation.   Prior cardiac surgery.   Signed by Vasquez Latham MD           Procedures  Procedures     EMERGENCY DEPARTMENT COURSE/MDM:     ED Course as of 10/20/23 2314   Fri Oct 20, 2023   2131 Troponin I, High Sensitivity(!): 24  Repeat ordered [RD]   2131 Lactate(!): 3.3  Will administer a liter of fluids. [RD]   2242 Troponin I, High Sensitivity(!): 48  ASA administered.  [RD]   2303 HEART Score: 5  [RD]      ED Course User  Index  [RD] Souleymane Goodson DO         Diagnoses as of 10/20/23 8454   Elevated troponin   Chest discomfort        Medical Decision Making  63-year-old male comes to the emergency room with viral symptoms.  Does have a history of coronary disease.  Given symptomatology, clinical scenario, abdominal lab work-up, chest pain work-up was ordered in triage along with chest x-ray, CT abdomen and pelvis with contrast.    My independent interpretation of labs and imaging, CBC, was unremarkable.  Chemistry panel did show signs consistent with mild dehydration with a low bicarb level.  We did give him a liter of fluids for this as well.  Lactic was elevated, treated with fluids well, did downtrend on recheck.  Lipase negative, no suspicion for pancreatitis.  COVID, flu testing was negative.  Troponin was 24, did uptrend to 48.  Did administer an aspirin at this point.  He was given a liter of fluids, Tylenol in the emergency department.    Patient was admitted to the hospital for further management given uptrending troponin.    Amount and/or Complexity of Data Reviewed  Labs: ordered. Decision-making details documented in ED Course.  Radiology: independent interpretation performed. Decision-making details documented in ED Course.  ECG/medicine tests: independent interpretation performed.     Details: EKG shows sinus rhythm with a ventricular rate of 64 bpm.  QTc 488.  No acute injury pattern.      Patient and or family in agreement and understanding of treatment plan.  All questions answered.      I reviewed the case with the attending ED physician. The attending ED physician agrees with the plan. Patient and/or patient´s representative was counseled regarding labs, imaging, likely diagnosis, and plan. All questions were answered.    ED Medications administered this visit:    Medications   iohexol (OMNIPaque) 350 mg iodine/mL solution 75 mL (75 mL intravenous Given 10/20/23 1959)   lactated Ringer's bolus 1,000 mL (0 mL  intravenous Stopped 10/20/23 2215)   acetaminophen (Tylenol) tablet 650 mg (650 mg oral Given 10/20/23 2154)   aspirin tablet 325 mg (325 mg oral Given 10/20/23 2304)       New Prescriptions from this visit:    New Prescriptions    No medications on file       Follow-up:  No follow-up provider specified.      Final Impression:   1. Elevated troponin    2. Chest discomfort          (Please note that portions of this note were completed with a voice recognition program.  Efforts were made to edit the dictations but occasionally words are mis-transcribed.)        Souleymane Goodson, DO  Resident  10/20/23 7065

## 2023-10-21 NOTE — CONSULTS
Inpatient consult to cardiology  Consult performed by: James C Ramicone, DO  Consult ordered by: Kendall Alexandra MD  Reason for consult: elevated troponin      History Of Present Illness:      This is a 63-year-old male with history of coronary artery disease.  He being seen today in consultation for evaluation of an elevated troponin level.  The patient presented to the emergency department with complaints of fever, chills, body aches, and emesis.  He was not feeling well over the past 2 to 3 days and had a loss of appetite and decreased energy levels.  He denied having any chest pain or shortness of breath.  He described having generalized fatigue and did not feel well.  Then he had an episode of emesis.  He is usually followed by Dr. Benitez from a cardiac standpoint and has had no new cardiac complaints.  Outpatient cardiology records reviewed today.    Past medical history:    CAD with history of CABG surgery October 2016 consisting of a LIMA to LAD, SVG to diagonal, SVG to OM, SVG to PDA  Ischemic cardiomyopathy, ejection fraction approximately 35%  Hyperlipidemia  Sleep apnea  Carotid artery disease    Social history: Former smoker    Family history: Negative for premature CAD    Review of Systems    Other review of systems negative    Social History:  He reports that he has never smoked. He has never used smokeless tobacco. He reports that he does not drink alcohol and does not use drugs.    Family History:  No family history on file.     Allergies:  Penicillin    Medications:  No current facility-administered medications for this encounter.     Current Outpatient Medications   Medication Sig Dispense Refill    carvedilol (Coreg) 3.125 mg tablet Take 1 tablet (3.125 mg) by mouth 2 times a day with meals.      cholecalciferol (Vitamin D-3) 25 MCG (1000 UT) capsule Take 1 capsule (25 mcg) by mouth once daily.      sertraline (Zoloft) 100 mg tablet Take 1 tablet (100 mg) by mouth once daily.      tiZANidine  "(Zanaflex) 2 mg tablet Take 1 tablet (2 mg) by mouth every 8 hours if needed.      aspirin 81 mg EC tablet Take 1 tablet (81 mg) by mouth once daily.      atorvastatin (Lipitor) 80 mg tablet Take 1 tablet (80 mg) by mouth once daily.      empagliflozin (Jardiance) 10 mg Take 1 tablet (10 mg) by mouth once daily.      ezetimibe (Zetia) 10 mg tablet Take 1 tablet (10 mg) by mouth once daily.      gabapentin (Neurontin) 400 mg capsule Take 1 capsule (400 mg) by mouth 3 times a day.           Last Recorded Vitals:  Vitals:    10/21/23 0930 10/21/23 1030 10/21/23 1100 10/21/23 1215   BP: 121/68 126/68 130/70    Pulse: 55 58 57 64   Resp: 19 20 20 25   Temp:       TempSrc:       SpO2: 93% 91% 94% 93%   Weight:       Height:           Physical Exam:    General Appearance:  Alert, oriented, no distress  Skin:  Warm and dry  Head and Neck:  No elevation of JVP, no carotid bruits  Cardiac Exam:  Rhythm is regular, S1 and S2 are normal, no murmur S3 or S4  Lungs:  Clear to auscultation  Extremities:  no edema  Neurologic:  No focal deficits  Psychiatric:  Appropriate mood and behavior         Last Labs:  CBC -  Lab Results   Component Value Date    WBC 10.3 10/20/2023    HGB 16.3 10/20/2023    HCT 48.2 10/20/2023    MCV 95 10/20/2023     10/20/2023       CMP -  Lab Results   Component Value Date    CALCIUM 9.1 10/21/2023    PROT 7.4 10/21/2023    ALBUMIN 4.2 10/21/2023    AST 73 (H) 10/21/2023    ALT 8 (L) 10/21/2023    ALKPHOS 65 10/21/2023    BILITOT 0.6 10/21/2023       LIPID PANEL -   No results found for: \"CHOL\", \"TRIG\", \"HDL\", \"CHHDL\", \"LDLF\", \"VLDL\", \"NHDL\"    RENAL FUNCTION PANEL -   Lab Results   Component Value Date    GLUCOSE 98 10/21/2023     10/21/2023    K 3.8 10/21/2023     (H) 10/21/2023    CO2 24 10/21/2023    ANIONGAP 13 10/21/2023    BUN 21 10/21/2023    CREATININE 0.90 10/21/2023    CALCIUM 9.1 10/21/2023    ALBUMIN 4.2 10/21/2023        No results found for: \"BNP\", \"HGBA1C\"    Test " Review:  I have personally review the diagnostic testing:    EKG: Sinus rhythm, poor R wave progression, with no acute ischemic changes.  Myocardial perfusion stress test: Ejection fraction 32% with extensive apical and inferolateral MI with no ischemia    Assessment/Plan:    1.  Elevated troponin: This is most likely related to demand ischemia based on the patient's clinical presentation.    2.  Ischemic cardiomyopathy: This patient has severe left ventricular dysfunction and a known ischemic cardiomyopathy.  He is going to have an outpatient EP consultation to consider primary prevention ICD insertion.                James C Ramicone, DO

## 2023-10-21 NOTE — H&P
History Of Present Illness  Max Rivera is a 63 y.o. male .  Patient seen and examined by me.  This is a 63 years old pleasant male and his wife the niece is present in the room.  History obtained from both.  Patient has history of type 2 diabetes, history of coronary artery disease/s/p CABG 10/2016, ischemic cardiomyopathy with EF of 32% and had a cardiac MRI 9/2023 followed by Dr. Benitez cardiology regularly, and has been advised to defibrillator placement which is scheduled on Friday next week with Dr. Valentine/hypertension, history of dyslipidemia, history of sleep apnea and has refused CPAP in the past, former smoker history of anxiety and depression stable on sertraline, hcx chronic low back pain and prior lumbar laminectomies and remains on gabapentin and tizanidine patient was seen in my office earlier in the week on Monday for his annual wellness physical and was doing well.  As per wife patient started to have a cough and postnasal drip 2 days ago and then had fatigue and tiredness and was not eating much and had an emesis and was brought to the hospital for further evaluation.  In the ED evaluation his chest x-ray was negative for any infiltrates or congestion but his troponin was elevated and he was admitted for further evaluation of ACS.  Patient's has been seen by Dr. Ramicone this a.m.  Patient currently is resting in bed.  He has had a good dinner.  He has had no chest pain or shortness of breath and has minimal cough and postnasal drip and mild nasal congestion.  Patient denies any fever but states he felt chilled at home.  Patient denies any abdominal pain or dysuria or nocturia.  Patient is feeling better.  Past Medical History  Past Medical History:   Diagnosis Date    Anxiety disorder, unspecified     Anxiety and depression    Diabetes mellitus (CMS/HCC)     Old myocardial infarction     History of myocardial infarction    Personal history of other diseases of the digestive system      History of gastrointestinal hemorrhage    Personal history of other endocrine, nutritional and metabolic disease     History of hyperlipidemia    Sleep apnea, unspecified 08/05/2022    Sleep apnea, unspecified type    Transient cerebral ischemic attack, unspecified     TIA (transient ischemic attack)       Surgical History  Past Surgical History:   Procedure Laterality Date    CATARACT EXTRACTION  08/11/2016    Cataract Surgery    LUMBAR LAMINECTOMY  08/11/2016    Laminectomy Lumbar    OTHER SURGICAL HISTORY  09/14/2021    Previous Stent Placement    OTHER SURGICAL HISTORY  02/07/2019    Tooth extraction    OTHER SURGICAL HISTORY  02/07/2019    Tonsillectomy    OTHER SURGICAL HISTORY  02/07/2019    Parotidectomy    OTHER SURGICAL HISTORY  02/07/2019    Back surgery    OTHER SURGICAL HISTORY  09/14/2021    Coronary Artery Quadruple Venous Bypass Graft    OTHER SURGICAL HISTORY  05/27/2022    Cardiac catheterization    OTHER SURGICAL HISTORY  06/20/2019    Colonoscopy        Social History  He reports that he has never smoked. He has never used smokeless tobacco. He reports that he does not drink alcohol and does not use drugs.  Patient is .  He lives with his wife.  Patient drives by himself and ambulates by himself.    Family History  No family history on file.  Family history reviewed and not applicable.  Allergies  Penicillin    Review of Systems   Constitutional:         EXCEPT FOR HPI   All other systems reviewed and are negative.  Except hpi     Physical Exam  Vitals and nursing note reviewed.   Constitutional:       General: He is not in acute distress.     Appearance: Normal appearance. He is normal weight. He is not ill-appearing or toxic-appearing.   HENT:      Head: Normocephalic and atraumatic.      Right Ear: Tympanic membrane and ear canal normal. There is no impacted cerumen.      Left Ear: Tympanic membrane, ear canal and external ear normal.      Nose: Nose normal. No congestion or rhinorrhea.       Mouth/Throat:      Pharynx: Oropharynx is clear. No oropharyngeal exudate or posterior oropharyngeal erythema.   Eyes:      General: No scleral icterus.        Right eye: No discharge.         Left eye: No discharge.      Extraocular Movements: Extraocular movements intact.      Conjunctiva/sclera: Conjunctivae normal.      Pupils: Pupils are equal, round, and reactive to light.   Neck:      Vascular: No carotid bruit.   Cardiovascular:      Rate and Rhythm: Normal rate and regular rhythm.      Pulses: Normal pulses.      Heart sounds: Normal heart sounds. No murmur heard.     No gallop.   Pulmonary:      Effort: Pulmonary effort is normal. No respiratory distress.      Breath sounds: Normal breath sounds. No wheezing, rhonchi or rales.   Abdominal:      General: Abdomen is flat. Bowel sounds are normal. There is no distension.      Palpations: Abdomen is soft. There is no mass.      Tenderness: There is no abdominal tenderness. There is no right CVA tenderness, left CVA tenderness, guarding or rebound.      Hernia: No hernia is present.   Musculoskeletal:         General: No swelling or tenderness. Normal range of motion.      Cervical back: Normal range of motion and neck supple. No rigidity.      Right lower leg: No edema.      Left lower leg: No edema.   Lymphadenopathy:      Cervical: No cervical adenopathy.   Skin:     General: Skin is warm.      Coloration: Skin is not jaundiced.      Findings: No erythema or rash.   Neurological:      General: No focal deficit present.      Mental Status: He is alert and oriented to person, place, and time. Mental status is at baseline.      Sensory: No sensory deficit.      Motor: No weakness.      Gait: Gait normal.      Deep Tendon Reflexes: Reflexes normal.   Psychiatric:         Mood and Affect: Mood normal.         Thought Content: Thought content normal.         Judgment: Judgment normal.          Last Recorded Vitals  Blood pressure 135/70, pulse 55, temperature  "36.4 °C (97.5 °F), temperature source Temporal, resp. rate 18, height 1.854 m (6' 1\"), weight 77.1 kg (170 lb), SpO2 93 %.    Relevant Results      Scheduled medications  aspirin, 81 mg, oral, Daily  atorvastatin, 80 mg, oral, Daily  [START ON 10/22/2023] carvedilol, 3.125 mg, oral, BID with meals  [START ON 10/22/2023] cholecalciferol, 25 mcg, oral, Daily  empagliflozin, 10 mg, oral, Daily  enoxaparin, 40 mg, subcutaneous, q24h  ezetimibe, 10 mg, oral, Daily  gabapentin, 400 mg, oral, TID  sertraline, 100 mg, oral, Daily      Continuous medications     PRN medications  PRN medications: tiZANidine    Results for orders placed or performed during the hospital encounter of 10/20/23 (from the past 24 hour(s))   Blood Culture    Specimen: Peripheral Venipuncture; Blood culture   Result Value Ref Range    Blood Culture Loaded on Instrument - Culture in progress    Lactate   Result Value Ref Range    Lactate 2.8 (H) 0.4 - 2.0 mmol/L   Troponin I, High Sensitivity   Result Value Ref Range    Troponin I, High Sensitivity 48 (H) 0 - 20 ng/L   Influenza A, and B PCR   Result Value Ref Range    Flu A Result Not Detected Not Detected    Flu B Result Not Detected Not Detected   SARS-CoV-2 RT PCR   Result Value Ref Range    Coronavirus 2019, PCR Not Detected Not Detected   Urinalysis with Reflex Microscopic and Culture   Result Value Ref Range    Color, Urine Yellow Straw, Yellow    Appearance, Urine Cloudy (N) Clear    Specific Gravity, Urine 1.020 1.005 - 1.035    pH, Urine 5.5 5.0, 5.5, 6.0, 6.5, 7.0, 7.5, 8.0    Protein, Urine 30 (1+) (A) NEGATIVE, 10 (TRACE) mg/dL    Glucose, Urine 150 (2+) (A) NEGATIVE mg/dL    Blood, Urine 1.0 (3+) (A) NEGATIVE    Ketones, Urine 40 (2+) (A) NEGATIVE mg/dL    Bilirubin, Urine NEGATIVE NEGATIVE    Urobilinogen, Urine NORM NORM mg/dL    Nitrite, Urine NEGATIVE NEGATIVE    Leukocyte Esterase, Urine NEGATIVE NEGATIVE   Extra Urine Gray Tube   Result Value Ref Range    Extra Tube Hold for " add-ons.    Urinalysis Microscopic   Result Value Ref Range    WBC, Urine 1-5 1-5, NONE /HPF    RBC, Urine 1-2 NONE, 1-2, 3-5 /HPF    Mucus, Urine 3+ Reference range not established. /LPF    Hyaline Casts, Urine 1+ (A) NONE /LPF   Comprehensive Metabolic Panel   Result Value Ref Range    Glucose 98 74 - 99 mg/dL    Sodium 143 136 - 145 mmol/L    Potassium 3.8 3.5 - 5.3 mmol/L    Chloride 110 (H) 98 - 107 mmol/L    Bicarbonate 24 21 - 32 mmol/L    Anion Gap 13 10 - 20 mmol/L    Urea Nitrogen 21 6 - 23 mg/dL    Creatinine 0.90 0.50 - 1.30 mg/dL    eGFR >90 >60 mL/min/1.73m*2    Calcium 9.1 8.6 - 10.3 mg/dL    Albumin 4.2 3.4 - 5.0 g/dL    Alkaline Phosphatase 65 33 - 136 U/L    Total Protein 7.4 6.4 - 8.2 g/dL    AST 73 (H) 9 - 39 U/L    Bilirubin, Total 0.6 0.0 - 1.2 mg/dL    ALT 8 (L) 10 - 52 U/L   Troponin I, High Sensitivity, Initial   Result Value Ref Range    Troponin I, High Sensitivity 85 (HH) 0 - 20 ng/L   Troponin, High Sensitivity, 1 Hour   Result Value Ref Range    Troponin I, High Sensitivity 86 (HH) 0 - 20 ng/L    CT abdomen pelvis w IV contrast    Result Date: 10/20/2023  STUDY: CT Abdomen and Pelvis with IV Contrast; 10/20/2023 8:00 PM. INDICATION: Abdominal pain with nausea and vomiting for 3 days. COMPARISON: None. ACCESSION NUMBER(S): [id number] ORDERING CLINICIAN: GREG DHALIWAL TECHNIQUE: CT of the abdomen and pelvis was performed.  Contiguous axial images were obtained at 3 mm slice thickness through the abdomen and pelvis. Coronal and sagittal reconstructions at 3 mm slice thickness were performed.  Omnipaque 350 75 mL was administered intravenously.  FINDINGS: LOWER CHEST: No cardiomegaly.  No pericardial effusion.  Lung bases are clear. Sternotomy wires are in place.  ABDOMEN:  LIVER: No hepatomegaly.  Smooth surface contour.  Normal attenuation.  BILE DUCTS: No intrahepatic or extrahepatic biliary ductal dilatation.  GALLBLADDER: The gallbladder is mildly distended.with no calcified  gallstones and no acute pericholecystic abnormalities. STOMACH: No acute abnormalities identified. There is a possible small hiatal hernia.  PANCREAS: No masses or ductal dilatation.  SPLEEN: No splenomegaly or focal splenic lesion.  ADRENAL GLANDS: No thickening or nodules.  KIDNEYS AND URETERS: Kidneys are normal in size and location.  No renal or ureteral calculi. There are small bilateral renal cysts.  PELVIS:  BLADDER: No abnormalities identified.  REPRODUCTIVE ORGANS: No acute abnormalities identified.  BOWEL: There is no bowel obstruction. Normal appendix. There are scattered colonic diverticula without evidence of diverticulitis  VESSELS: No acute abnormalities identified.  There is ectasia of the infrarenal aorta measuring 3.0 cm in diameter. There are scattered vascular calcifications along the arterial tree  PERITONEUM/RETROPERITONEUM/LYMPH NODES: No free fluid.  No pneumoperitoneum. No lymphadenopathy.  ABDOMINAL WALL: No acute abnormalities identified. There is a small bland fat-containing periumbilical hernia.  BONES: No acute fracture or aggressive osseous lesion. Degenerative changes are apparent in the spine.    1.No acute intra-abdominal or pelvic abnormalities. No bowel obstruction, free fluid, or free air. Normal appendix. 2.Mildly distended gallbladder with no acute pericholecystic abnormalities. 3.Scattered colonic diverticula without evidence of diverticulitis. Signed by Maria L Liang DO    XR chest 2 views    Result Date: 10/20/2023  STUDY: Chest Radiographs;  10/20/2023, 1:40PM. INDICATION: Abdominal pain. COMPARISON: CXR 10/30/2018. ACCESSION NUMBER(S): [id number] ORDERING CLINICIAN: GREG DHALIWAL TECHNIQUE:  Frontal and lateral chest. FINDINGS: CARDIOMEDIASTINAL SILHOUETTE: Cardiomediastinal silhouette is normal in size and configuration. Prior cardiac surgery.  Small epicardial pacing wires are present on the lateral view.  LUNGS: No focal regions of airspace consolidation.  No  pneumothorax or significant pleural effusion.  ABDOMEN: No remarkable upper abdominal findings.  BONES: No acute osseous changes.    No focal regions of airspace consolidation. Prior cardiac surgery. Signed by Vasquez Latham MD        Assessment/Plan   #1/Elevated troponin enzymes/rule out ACS/ischemic cardiomyopathy with EF of 32% to 35% on cardiac MRI 9/14/2023 and prior infarct in left circumflex artery and LAD/cardiology consultation has been obtained and patient has been seen by Dr. Ramicone  Patient has been resumed on his home medications which are Coreg and Zetia and atorvastatin 80 mg p.o. daily.  Patient is currently chest pain-free.  Patient is scheduled to have an AICD this coming Friday.  2./Nausea and emesis /chillsand patient underwent a CT scan of the abdomen and pelvis on admission which shows no concerning obstruction.  Nausea and emesis have improved and patient is tolerating p.o. food.  Patient is afebrile on admission/WBC count is normal.  No signs of overt infection.  3./Mild cough productive of clear phlegm most likely secondary to allergic rhinosinusitis and patient will be started on loratadine 10 mg p.o. daily.  4./Type 2 diabetes patient has been switched to Jardiance 10 mg p.o. daily by cardiology recently and will be resumed on it.  5./History of depression and generalized anxiety disorder.  Patient has been resumed on his sertraline.  6./History of lumbar laminectomy and chronic low back pain and neuropathy and patient has been resumed on his gabapentin.  Patient is clinically doing better and will await cardiology recommendations.  Possible discharge in the next 24 to 48 hours once cleared by cardiology.  Total time spent 40 minutes/time spent on patient interaction/counseling/assessment and plan and documentation and discussing plan of care with his wife at bedside.           I spent  minutes in the professional and overall care of this patient.      Kamran Tejada MD

## 2023-10-21 NOTE — PROGRESS NOTES
This TCC met with patient at bedside, introduced self and explained role.  Demographic information and insurance verified.  Patient's PCP is Dr. Kamran Tejada.  Patient is from home with spouse and adult son.  Denies SW needs at this time.  Patient plans to return home at discharge, no needs.  Patient's wife will transport home.  TCC will continue to follow care progression for discharge planning needs.     10/21/23 0838   Discharge Planning   Living Arrangements Spouse/significant other;Children  (adult son)   Support Systems Spouse/significant other;Children   Assistance Needed Independent; driving   Type of Residence Private residence  (2-story)   Number of Stairs to Enter Residence 4   Number of Stairs Within Residence 20   Do you have animals or pets at home? Yes   Type of Animals or Pets cat   Who is requesting discharge planning?   (Care Coordination workflow)   Home or Post Acute Services None   Patient expects to be discharged to: Home   Does the patient need discharge transport arranged? No  (Wife will transport patient home)   Financial Resource Strain   How hard is it for you to pay for the very basics like food, housing, medical care, and heating? Not hard   Housing Stability   In the last 12 months, was there a time when you were not able to pay the mortgage or rent on time? N   In the last 12 months, how many places have you lived? 1   In the last 12 months, was there a time when you did not have a steady place to sleep or slept in a shelter (including now)? N   Transportation Needs   In the past 12 months, has lack of transportation kept you from medical appointments or from getting medications? no   In the past 12 months, has lack of transportation kept you from meetings, work, or from getting things needed for daily living? No       Camelia Williamson RN ED TCC

## 2023-10-22 VITALS
SYSTOLIC BLOOD PRESSURE: 122 MMHG | DIASTOLIC BLOOD PRESSURE: 60 MMHG | OXYGEN SATURATION: 93 % | TEMPERATURE: 97.5 F | HEIGHT: 73 IN | RESPIRATION RATE: 16 BRPM | BODY MASS INDEX: 22.53 KG/M2 | WEIGHT: 170 LBS | HEART RATE: 55 BPM

## 2023-10-22 LAB
ALBUMIN SERPL BCP-MCNC: 3.9 G/DL (ref 3.4–5)
ALP SERPL-CCNC: 69 U/L (ref 33–136)
ALT SERPL W P-5'-P-CCNC: 10 U/L (ref 10–52)
ANION GAP SERPL CALC-SCNC: 15 MMOL/L (ref 10–20)
AST SERPL W P-5'-P-CCNC: 86 U/L (ref 9–39)
BILIRUB SERPL-MCNC: 0.5 MG/DL (ref 0–1.2)
BUN SERPL-MCNC: 21 MG/DL (ref 6–23)
CALCIUM SERPL-MCNC: 9.2 MG/DL (ref 8.6–10.3)
CARDIAC TROPONIN I PNL SERPL HS: 41 NG/L (ref 0–20)
CHLORIDE SERPL-SCNC: 107 MMOL/L (ref 98–107)
CO2 SERPL-SCNC: 23 MMOL/L (ref 21–32)
CREAT SERPL-MCNC: 0.84 MG/DL (ref 0.5–1.3)
ERYTHROCYTE [DISTWIDTH] IN BLOOD BY AUTOMATED COUNT: 14.2 % (ref 11.5–14.5)
GFR SERPL CREATININE-BSD FRML MDRD: >90 ML/MIN/1.73M*2
GLUCOSE SERPL-MCNC: 94 MG/DL (ref 74–99)
HCT VFR BLD AUTO: 44.9 % (ref 41–52)
HGB BLD-MCNC: 14.9 G/DL (ref 13.5–17.5)
LACTATE SERPL-SCNC: 1.1 MMOL/L (ref 0.4–2)
MCH RBC QN AUTO: 32.6 PG (ref 26–34)
MCHC RBC AUTO-ENTMCNC: 33.2 G/DL (ref 32–36)
MCV RBC AUTO: 98 FL (ref 80–100)
NRBC BLD-RTO: 0 /100 WBCS (ref 0–0)
PLATELET # BLD AUTO: 238 X10*3/UL (ref 150–450)
PMV BLD AUTO: 9.5 FL (ref 7.5–11.5)
POTASSIUM SERPL-SCNC: 3.7 MMOL/L (ref 3.5–5.3)
PROT SERPL-MCNC: 6.9 G/DL (ref 6.4–8.2)
RBC # BLD AUTO: 4.57 X10*6/UL (ref 4.5–5.9)
SODIUM SERPL-SCNC: 141 MMOL/L (ref 136–145)
WBC # BLD AUTO: 9.2 X10*3/UL (ref 4.4–11.3)

## 2023-10-22 PROCEDURE — 36415 COLL VENOUS BLD VENIPUNCTURE: CPT

## 2023-10-22 PROCEDURE — 84484 ASSAY OF TROPONIN QUANT: CPT

## 2023-10-22 PROCEDURE — 85027 COMPLETE CBC AUTOMATED: CPT | Performed by: INTERNAL MEDICINE

## 2023-10-22 PROCEDURE — 2500000001 HC RX 250 WO HCPCS SELF ADMINISTERED DRUGS (ALT 637 FOR MEDICARE OP): Performed by: INTERNAL MEDICINE

## 2023-10-22 PROCEDURE — 2500000004 HC RX 250 GENERAL PHARMACY W/ HCPCS (ALT 636 FOR OP/ED): Mod: MUE | Performed by: INTERNAL MEDICINE

## 2023-10-22 PROCEDURE — 83605 ASSAY OF LACTIC ACID: CPT

## 2023-10-22 PROCEDURE — 36415 COLL VENOUS BLD VENIPUNCTURE: CPT | Performed by: INTERNAL MEDICINE

## 2023-10-22 PROCEDURE — G0378 HOSPITAL OBSERVATION PER HR: HCPCS

## 2023-10-22 PROCEDURE — 99232 SBSQ HOSP IP/OBS MODERATE 35: CPT | Performed by: INTERNAL MEDICINE

## 2023-10-22 PROCEDURE — 80053 COMPREHEN METABOLIC PANEL: CPT | Performed by: INTERNAL MEDICINE

## 2023-10-22 PROCEDURE — 2500000002 HC RX 250 W HCPCS SELF ADMINISTERED DRUGS (ALT 637 FOR MEDICARE OP, ALT 636 FOR OP/ED): Mod: MUE | Performed by: INTERNAL MEDICINE

## 2023-10-22 RX ORDER — CHOLECALCIFEROL (VITAMIN D3) 25 MCG
1000 TABLET ORAL DAILY
Status: DISCONTINUED | OUTPATIENT
Start: 2023-10-22 | End: 2023-10-22 | Stop reason: HOSPADM

## 2023-10-22 RX ADMIN — ATORVASTATIN CALCIUM 80 MG: 80 TABLET, FILM COATED ORAL at 08:08

## 2023-10-22 RX ADMIN — EMPAGLIFLOZIN 10 MG: 10 TABLET, FILM COATED ORAL at 08:08

## 2023-10-22 RX ADMIN — EZETIMIBE 10 MG: 10 TABLET ORAL at 08:09

## 2023-10-22 RX ADMIN — CARVEDILOL 3.12 MG: 3.12 TABLET, FILM COATED ORAL at 08:09

## 2023-10-22 RX ADMIN — Medication 25 MCG: at 09:00

## 2023-10-22 RX ADMIN — ASPIRIN 81 MG: 81 TABLET, COATED ORAL at 08:09

## 2023-10-22 RX ADMIN — GABAPENTIN 400 MG: 400 CAPSULE ORAL at 08:08

## 2023-10-22 RX ADMIN — SERTRALINE HYDROCHLORIDE 100 MG: 100 TABLET ORAL at 08:09

## 2023-10-22 ASSESSMENT — PAIN SCALES - GENERAL: PAINLEVEL_OUTOF10: 0 - NO PAIN

## 2023-10-22 ASSESSMENT — PAIN - FUNCTIONAL ASSESSMENT: PAIN_FUNCTIONAL_ASSESSMENT: 0-10

## 2023-10-22 NOTE — DISCHARGE SUMMARY
"Discharge Diagnosis//Hospital course  This is a 63 years old pleasant male followed by me as his PCP in the community with history of coronary artery disease s/p CABG 10/2016 history of ischemic cardiomyopathy\" ejection fraction of 32% on nuclear stress test followed by Dr. Benitez in the outpatient and seen by Dr. Ramicone During this admission who presented to the emergency room with chief complaint of having chills nausea emesis and was found to have elevated troponin enzymes in the ED and was admitted to rule out ACS and further evaluation for his CAD.  Cardiology consultation was obtained.  Patient was ruled out for ACS and most likely had demand ischemia.  Cardiology recommended patient to continue to follow-up with Dr. Tate/[facility] for further placement of an AICD for at the end of this week.  Patient had a CT scan of the abdomen also which showed no bowel obstruction or concerning lesions.  His nausea and emesis and chills resolved.  His COVID-19 was negative and flu tests were negative also.  Patient tolerated p.o. food.  He had no chest pain or shortness of breath.  He remained afebrile.  His LFTs were slightly elevated and will be monitored in the outpatient.  This could be secondary to viral illness.  Patient denies any alcohol use.  Patient was continued on all his antihypertensive medications and was continued on his Zetia and his atorvastatin 80 mg p.o. daily and was discharged home in stable condition.  Patient advised to make a follow-up appointment with me in 1 to 2 weeks and to continue to follow-up with cardiology as per orders.  Clinical condition at the time of discharge was stable.  Medications reconciled.  Hence his discharge diagnosis is  1./Nausea emesis chills most likely secondary to a viral illness/mildly elevated LFTs which will be followed in the outpatient and repeat LFTs to be done in 2 to 4 weeks.  2./Elevated troponin enzymes on admission and patient was ruled out for ACS and most " likely secondary to demand ischemia and seen by cardiology during this admission  3./Ischemic cardiomyopathy with EF of 32 to percent and history of coronary artery disease and history of prior CABG/being evaluated by [facility] for AICD placement this Friday with Dr. Chu and patient to follow-up as advised.  #4/type 2 diabetes which is controlled with his current medications and he was switched to Jardiance 10 mg p.o. daily from his metformin.  5./Hypertension/patient was continued on Coreg 3.125 mg p.o. every 12 hours.  6./Dyslipidemia  7./Generalized anxiety disorder and depression/symptoms stable on his current dose of sertraline.  8./Lumbar chronic low back pain and history of prior lumbar laminectomies and patient was maintained on his gabapentin and his tizanidine.  Clinical condition at the time of discharge was stable and improved.  Total time spent 38 minutes time spent on patient interaction/counseling/assessment and plan and documentation and coordination of discharge planning and discussing plan of care with his wife Cynthia.  Medications were reconciled.  Labs and vitals were noted.  Issues Requiring Follow-Up      Test Results Pending At Discharge  Pending Labs       Order Current Status    Blood Culture Preliminary result    Blood Culture Preliminary result                 Pertinent Physical Exam At Time of Discharge  Physical Exam  Vitals and nursing note reviewed.   Constitutional:       General: He is not in acute distress.     Appearance: Normal appearance. He is normal weight. He is not ill-appearing or toxic-appearing.   HENT:      Head: Normocephalic and atraumatic.      Right Ear: Tympanic membrane and ear canal normal. There is no impacted cerumen.      Left Ear: Tympanic membrane, ear canal and external ear normal.      Nose: Nose normal. No congestion or rhinorrhea.      Mouth/Throat:      Pharynx: Oropharynx is clear. No oropharyngeal exudate or posterior oropharyngeal erythema.   Eyes:       General: No scleral icterus.        Right eye: No discharge.         Left eye: No discharge.      Extraocular Movements: Extraocular movements intact.      Conjunctiva/sclera: Conjunctivae normal.      Pupils: Pupils are equal, round, and reactive to light.   Neck:      Vascular: No carotid bruit.   Cardiovascular:      Rate and Rhythm: Normal rate and regular rhythm.      Pulses: Normal pulses.      Heart sounds: Normal heart sounds. No murmur heard.     No gallop.   Pulmonary:      Effort: Pulmonary effort is normal. No respiratory distress.      Breath sounds: Normal breath sounds. No wheezing, rhonchi or rales.   Abdominal:      General: Abdomen is flat. Bowel sounds are normal. There is no distension.      Palpations: Abdomen is soft. There is no mass.      Tenderness: There is no abdominal tenderness. There is no right CVA tenderness, left CVA tenderness, guarding or rebound.      Hernia: No hernia is present.   Musculoskeletal:         General: No swelling or tenderness. Normal range of motion.      Cervical back: Normal range of motion and neck supple. No rigidity.      Right lower leg: No edema.      Left lower leg: No edema.   Lymphadenopathy:      Cervical: No cervical adenopathy.   Skin:     General: Skin is warm.      Coloration: Skin is not jaundiced.      Findings: No erythema or rash.   Neurological:      General: No focal deficit present.      Mental Status: He is alert and oriented to person, place, and time. Mental status is at baseline.      Sensory: No sensory deficit.      Motor: No weakness.      Gait: Gait normal.      Deep Tendon Reflexes: Reflexes normal.   Psychiatric:         Mood and Affect: Mood normal.         Thought Content: Thought content normal.         Judgment: Judgment normal.         Home Medications     Medication List      CONTINUE taking these medications     aspirin 81 mg EC tablet   atorvastatin 80 mg tablet; Commonly known as: Lipitor   carvedilol 3.125 mg tablet;  Commonly known as: Coreg   cholecalciferol 25 MCG (1000 UT) capsule; Commonly known as: Vitamin D-3   empagliflozin 10 mg; Commonly known as: Jardiance   ezetimibe 10 mg tablet; Commonly known as: Zetia   gabapentin 400 mg capsule; Commonly known as: Neurontin   sertraline 100 mg tablet; Commonly known as: Zoloft   tiZANidine 2 mg tablet; Commonly known as: Zanaflex       Outpatient Follow-Up  Future Appointments   Date Time Provider Department Center   10/27/2023 10:00 AM James C Ramicone, DO [id number] [facility]   3/28/2024 10:00 AM Poncho Benitez DO [id number] [facility]       Kamran Tejada MD

## 2023-10-22 NOTE — PROGRESS NOTES
History Of Present Illness:      This is a 63-year-old male with a history of coronary artery disease.  He presented to [facility] with complaints of fever, chills, body aches and emesis.  He was not feeling well for 2 to 3 days prior to his presentation.  Overall he is feeling much better today and is entirely asymptomatic.  No new cardiac complaints.    Past medical history:     CAD with history of CABG surgery October 2016 consisting of a LIMA to LAD, SVG to diagonal, SVG to OM, SVG to PDA  Ischemic cardiomyopathy, ejection fraction approximately 35%  Hyperlipidemia  Sleep apnea  Carotid artery disease     Social history: Former smoker     Family history: Negative for premature CAD    Review of Systems  Other review of systems negative     Last Recorded Vitals:      10/21/2023     2:00 PM 10/21/2023     3:00 PM 10/21/2023     4:17 PM 10/21/2023     7:57 PM 10/21/2023    11:45 PM 10/22/2023     4:30 AM 10/22/2023     9:57 AM   Vitals   Systolic 114 120 135 114 116 94 111   Diastolic 61 66 70 66 74 66 74   Heart Rate 60 59 55 71 60 59 58   Temp   36.4 °C (97.5 °F) 36.4 °C (97.5 °F) 36.6 °C (97.9 °F) 36.2 °C (97.2 °F) 36.3 °C (97.3 °F)   Resp 22 20 18 14 12 14 16          Allergies:  Penicillin    Outpatient Medications:  Current Outpatient Medications   Medication Instructions    aspirin 81 mg EC tablet 1 tablet, oral, Daily    atorvastatin (LIPITOR) 80 mg, oral, Daily    carvedilol (COREG) 3.125 mg, oral, 2 times daily with meals    cholecalciferol (VITAMIN D-3) 25 mcg, oral, Daily    empagliflozin (JARDIANCE) 10 mg, oral, Daily    ezetimibe (ZETIA) 10 mg, oral, Daily    gabapentin (NEURONTIN) 400 mg, oral, 3 times daily    sertraline (Zoloft) 100 mg tablet 1 tablet, oral, Daily    tiZANidine (ZANAFLEX) 2 mg, oral, Every 8 hours PRN       Physical Exam:    General Appearance:  Alert, oriented, no distress  Skin:  Warm and dry  Head and Neck:  No elevation of JVP, no carotid bruits  Cardiac Exam:   Rhythm is regular, S1 and S2 are normal, no murmur S3 or S4  Lungs:  Clear to auscultation  Extremities:  no edema  Neurologic:  No focal deficits  Psychiatric:  Appropriate mood and behavior         Cardiology Tests:  I have personally review the diagnostic cardiac testing and my interpretation is as follows:    EKG: Sinus rhythm, poor R wave progression, with no acute ischemic changes.  Myocardial perfusion stress test: Ejection fraction 32% with extensive apical and inferolateral MI with no ischemia      Assessment/Plan     1.  Elevated troponin: The troponin elevation is most likely related to demand ischemia based on the patient's clinical presentation yesterday.  He is entirely asymptomatic from a cardiac standpoint and does not require an ischemic evaluation.  He is cleared for discharge from a cardiac standpoint and is going to follow-up next week with Dr. Valentine as scheduled for EP consultation.  The patient has an ischemic cardiomyopathy and is being considered for a primary prevention ICD.    James C Ramicone, DO

## 2023-10-24 LAB — BACTERIA BLD CULT: NORMAL

## 2023-10-25 LAB — BACTERIA BLD CULT: NORMAL

## 2023-10-26 PROBLEM — E11.9 TYPE 2 DIABETES MELLITUS TREATED WITHOUT INSULIN (MULTI): Status: ACTIVE | Noted: 2023-10-17

## 2023-10-26 PROBLEM — I95.9 HYPOTENSION: Status: ACTIVE | Noted: 2023-10-26

## 2023-10-26 PROBLEM — Z95.1 S/P CABG X 4: Status: ACTIVE | Noted: 2023-10-26

## 2023-10-26 PROBLEM — Z98.890 HX OF LAMINECTOMY: Status: ACTIVE | Noted: 2023-10-17

## 2023-10-26 PROBLEM — Z86.59 HX OF MAJOR DEPRESSION: Status: ACTIVE | Noted: 2023-10-17

## 2023-10-26 PROBLEM — R06.02 SHORTNESS OF BREATH: Status: ACTIVE | Noted: 2023-10-26

## 2023-10-26 PROBLEM — I25.10 ISCHEMIC DILATED CARDIOMYOPATHY DUE TO CORONARY ARTERY DISEASE: Status: ACTIVE | Noted: 2023-10-17

## 2023-10-26 PROBLEM — M96.1 POSTLAMINECTOMY SYNDROME OF LUMBAR REGION: Status: ACTIVE | Noted: 2023-10-26

## 2023-10-26 PROBLEM — M54.16 LUMBAR RADICULOPATHY: Status: ACTIVE | Noted: 2023-10-26

## 2023-10-26 PROBLEM — F32.A DEPRESSION: Status: ACTIVE | Noted: 2023-10-26

## 2023-10-26 PROBLEM — I10 HYPERTENSION: Status: RESOLVED | Noted: 2023-10-26 | Resolved: 2023-10-26

## 2023-10-26 PROBLEM — M51.369 LUMBAR DEGENERATIVE DISC DISEASE: Status: ACTIVE | Noted: 2023-10-26

## 2023-10-26 PROBLEM — I49.3 PVC (PREMATURE VENTRICULAR CONTRACTION): Status: ACTIVE | Noted: 2023-10-26

## 2023-10-26 PROBLEM — I77.9 RIGHT-SIDED CAROTID ARTERY DISEASE (CMS-HCC): Status: ACTIVE | Noted: 2023-10-17

## 2023-10-26 PROBLEM — M96.1 POSTLAMINECTOMY SYNDROME OF LUMBAR REGION: Status: RESOLVED | Noted: 2023-10-26 | Resolved: 2023-10-26

## 2023-10-26 PROBLEM — G47.33 OSA (OBSTRUCTIVE SLEEP APNEA): Status: ACTIVE | Noted: 2023-10-17

## 2023-10-26 PROBLEM — M51.36 LUMBAR DEGENERATIVE DISC DISEASE: Status: ACTIVE | Noted: 2023-10-26

## 2023-10-26 PROBLEM — Z86.69 H/O BENIGN ESSENTIAL TREMOR: Status: ACTIVE | Noted: 2023-10-17

## 2023-10-26 PROBLEM — I10 HYPERTENSION: Status: ACTIVE | Noted: 2023-10-26

## 2023-10-26 PROBLEM — I25.5 ISCHEMIC DILATED CARDIOMYOPATHY DUE TO CORONARY ARTERY DISEASE: Status: ACTIVE | Noted: 2023-10-17

## 2023-10-26 PROBLEM — K92.2 UPPER GASTROINTESTINAL BLEEDING: Status: ACTIVE | Noted: 2023-10-26

## 2023-10-26 PROBLEM — G47.19 EXCESSIVE DAYTIME SLEEPINESS: Status: ACTIVE | Noted: 2023-10-26

## 2023-10-26 PROBLEM — K63.5 BENIGN COLON POLYP: Status: ACTIVE | Noted: 2023-10-17

## 2023-10-26 PROBLEM — Z86.59 HX OF MAJOR DEPRESSION: Status: RESOLVED | Noted: 2023-10-17 | Resolved: 2023-10-26

## 2023-10-26 PROBLEM — E78.00 PURE HYPERCHOLESTEROLEMIA: Status: ACTIVE | Noted: 2023-10-26

## 2023-10-26 PROBLEM — G25.0 BENIGN ESSENTIAL TREMOR: Status: ACTIVE | Noted: 2023-10-26

## 2023-10-26 PROBLEM — I25.10 CORONARY ARTERY DISEASE: Status: ACTIVE | Noted: 2023-10-26

## 2023-10-26 RX ORDER — BLOOD SUGAR DIAGNOSTIC
STRIP MISCELLANEOUS
COMMUNITY

## 2023-10-27 ENCOUNTER — OFFICE VISIT (OUTPATIENT)
Dept: CARDIOLOGY | Facility: CLINIC | Age: 63
End: 2023-10-27
Payer: MEDICARE

## 2023-10-27 VITALS
OXYGEN SATURATION: 94 % | WEIGHT: 179 LBS | HEIGHT: 73 IN | DIASTOLIC BLOOD PRESSURE: 60 MMHG | HEART RATE: 52 BPM | BODY MASS INDEX: 23.72 KG/M2 | SYSTOLIC BLOOD PRESSURE: 102 MMHG

## 2023-10-27 DIAGNOSIS — I50.22 CHRONIC SYSTOLIC HEART FAILURE (MULTI): ICD-10-CM

## 2023-10-27 DIAGNOSIS — I25.5 ISCHEMIC DILATED CARDIOMYOPATHY DUE TO CORONARY ARTERY DISEASE: Primary | ICD-10-CM

## 2023-10-27 DIAGNOSIS — I25.10 ISCHEMIC DILATED CARDIOMYOPATHY DUE TO CORONARY ARTERY DISEASE: Primary | ICD-10-CM

## 2023-10-27 DIAGNOSIS — I25.5 ISCHEMIC CARDIOMYOPATHY: ICD-10-CM

## 2023-10-27 DIAGNOSIS — I25.5 ISCHEMIC DILATED CARDIOMYOPATHY DUE TO CORONARY ARTERY DISEASE: ICD-10-CM

## 2023-10-27 DIAGNOSIS — I25.10 ISCHEMIC DILATED CARDIOMYOPATHY DUE TO CORONARY ARTERY DISEASE: ICD-10-CM

## 2023-10-27 PROCEDURE — 1036F TOBACCO NON-USER: CPT | Performed by: INTERNAL MEDICINE

## 2023-10-27 PROCEDURE — 1159F MED LIST DOCD IN RCRD: CPT | Performed by: INTERNAL MEDICINE

## 2023-10-27 PROCEDURE — 3078F DIAST BP <80 MM HG: CPT | Performed by: INTERNAL MEDICINE

## 2023-10-27 PROCEDURE — 99215 OFFICE O/P EST HI 40 MIN: CPT | Performed by: INTERNAL MEDICINE

## 2023-10-27 PROCEDURE — 3044F HG A1C LEVEL LT 7.0%: CPT | Performed by: INTERNAL MEDICINE

## 2023-10-27 PROCEDURE — 3074F SYST BP LT 130 MM HG: CPT | Performed by: INTERNAL MEDICINE

## 2023-10-27 PROCEDURE — 1125F AMNT PAIN NOTED PAIN PRSNT: CPT | Performed by: INTERNAL MEDICINE

## 2023-10-27 NOTE — H&P (VIEW-ONLY)
"History Of Present Illness:      This is a 63-year-old male with a history of coronary artery disease.  He had a recent hospitalization at [facility] because of fever, chills, and weakness.  He had a slight troponin elevation but no other change in cardiac status.  He is being considered for a primary prevention ICD insertion.  No episodes of syncope.    Past medical history:    Coronary artery disease with history of CABG surgery in October 2016 consisting of a LIMA to LAD, SVG to diagonal, SVG to obtuse marginal, SVG to PDA.  Chronic systolic congestive heart failure, ejection fraction 30 to 35%  Hyperlipidemia  Sleep apnea  Carotid artery disease    Social history: Former smoker    Family history: Negative for premature coronary artery disease    Review of Systems  Other review of systems negative     Last Recorded Vitals:      10/21/2023     4:17 PM 10/21/2023     7:57 PM 10/21/2023    11:45 PM 10/22/2023     4:30 AM 10/22/2023     9:57 AM 10/22/2023    12:49 PM 10/27/2023     9:56 AM   Vitals   Systolic 135 114 116 94 111 122 102   Diastolic 70 66 74 66 74 60 60   Heart Rate 55 71 60 59 58 55 52   Temp 36.4 °C (97.5 °F) 36.4 °C (97.5 °F) 36.6 °C (97.9 °F) 36.2 °C (97.2 °F) 36.3 °C (97.3 °F) 36.4 °C (97.5 °F)    Resp 18 14 12 14 16 16    Height (in)       1.854 m (6' 1\")   Weight (lb)       179   BMI       23.62 kg/m2   BSA (m2)       2.05 m2   Visit Report       Report          Allergies:  Penicillin    Outpatient Medications:  Current Outpatient Medications   Medication Instructions    aspirin 81 mg EC tablet 1 tablet, oral, Daily    atorvastatin (LIPITOR) 80 mg, oral, Daily    carvedilol (COREG) 3.125 mg, oral, 2 times daily with meals    cholecalciferol (VITAMIN D-3) 25 mcg, oral, Daily    empagliflozin (JARDIANCE) 10 mg, oral, Daily    ezetimibe (ZETIA) 10 mg, oral, Daily    gabapentin (NEURONTIN) 400 mg, oral, 3 times daily    OneTouch Ultra Test strip Use as directed    sertraline (Zoloft) " 100 mg tablet 1 tablet, oral, Daily    tiZANidine (ZANAFLEX) 2 mg, oral, Every 8 hours PRN       Physical Exam:    General Appearance:  Alert, oriented, no distress  Skin:  Warm and dry  Head and Neck:  No elevation of JVP, no carotid bruits  Cardiac Exam:  Rhythm is regular, S1 and S2 are normal, no murmur S3 or S4  Lungs:  Clear to auscultation  Extremities:  no edema  Neurologic:  No focal deficits  Psychiatric:  Appropriate mood and behavior         Cardiology Tests:  I have personally review the diagnostic cardiac testing and my interpretation is as follows:    Myocardial perfusion stress test March 2023: Evidence of extensive apical myocardial infarction, ejection fraction 32%      Assessment/Plan   Problem List Items Addressed This Visit             ICD-10-CM    Ischemic dilated cardiomyopathy due to coronary artery disease - Primary I25.5, I25.10    Chronic systolic heart failure (CMS/[facility]) I50.22     1.  Chronic systolic congestive heart failure, NYHA II: This patient has CAD with prior myocardial infarction and ejection fraction 30 to 35%.  He is at increased risk for sudden cardiac death, and a primary prevention ICD is recommended for sudden death protection.  The device implantation procedure, risks, and alternatives were discussed.  He would benefit from a dual chamber pacing ICD because he is bradycardic at times, and he is not a candidate for cardiac resynchronization therapy.  Shared decision making literature was provided during today's visit.  The [place] SDM information was provided for the patient and his wife to review today.  They are in agreement with the recommendation to proceed with ICD insertion and this will be scheduled at [facility] in the near future.          Other Visit Diagnoses         Codes    Ischemic cardiomyopathy     I25.5            James C Ramicone, DO

## 2023-10-27 NOTE — PATIENT INSTRUCTIONS
Schedule Medtronic ICD Insertion at [facility].  Dx: Chronic Systolic Congestive Heart Failure  CPT:23128

## 2023-10-27 NOTE — PROGRESS NOTES
"History Of Present Illness:      This is a 63-year-old male with a history of coronary artery disease.  He had a recent hospitalization at [facility] because of fever, chills, and weakness.  He had a slight troponin elevation but no other change in cardiac status.  He is being considered for a primary prevention ICD insertion.  No episodes of syncope.    Past medical history:    Coronary artery disease with history of CABG surgery in October 2016 consisting of a LIMA to LAD, SVG to diagonal, SVG to obtuse marginal, SVG to PDA.  Chronic systolic congestive heart failure, ejection fraction 30 to 35%  Hyperlipidemia  Sleep apnea  Carotid artery disease    Social history: Former smoker    Family history: Negative for premature coronary artery disease    Review of Systems  Other review of systems negative     Last Recorded Vitals:      10/21/2023     4:17 PM 10/21/2023     7:57 PM 10/21/2023    11:45 PM 10/22/2023     4:30 AM 10/22/2023     9:57 AM 10/22/2023    12:49 PM 10/27/2023     9:56 AM   Vitals   Systolic 135 114 116 94 111 122 102   Diastolic 70 66 74 66 74 60 60   Heart Rate 55 71 60 59 58 55 52   Temp 36.4 °C (97.5 °F) 36.4 °C (97.5 °F) 36.6 °C (97.9 °F) 36.2 °C (97.2 °F) 36.3 °C (97.3 °F) 36.4 °C (97.5 °F)    Resp 18 14 12 14 16 16    Height (in)       1.854 m (6' 1\")   Weight (lb)       179   BMI       23.62 kg/m2   BSA (m2)       2.05 m2   Visit Report       Report          Allergies:  Penicillin    Outpatient Medications:  Current Outpatient Medications   Medication Instructions    aspirin 81 mg EC tablet 1 tablet, oral, Daily    atorvastatin (LIPITOR) 80 mg, oral, Daily    carvedilol (COREG) 3.125 mg, oral, 2 times daily with meals    cholecalciferol (VITAMIN D-3) 25 mcg, oral, Daily    empagliflozin (JARDIANCE) 10 mg, oral, Daily    ezetimibe (ZETIA) 10 mg, oral, Daily    gabapentin (NEURONTIN) 400 mg, oral, 3 times daily    OneTouch Ultra Test strip Use as directed    sertraline (Zoloft) " 100 mg tablet 1 tablet, oral, Daily    tiZANidine (ZANAFLEX) 2 mg, oral, Every 8 hours PRN       Physical Exam:    General Appearance:  Alert, oriented, no distress  Skin:  Warm and dry  Head and Neck:  No elevation of JVP, no carotid bruits  Cardiac Exam:  Rhythm is regular, S1 and S2 are normal, no murmur S3 or S4  Lungs:  Clear to auscultation  Extremities:  no edema  Neurologic:  No focal deficits  Psychiatric:  Appropriate mood and behavior         Cardiology Tests:  I have personally review the diagnostic cardiac testing and my interpretation is as follows:    Myocardial perfusion stress test March 2023: Evidence of extensive apical myocardial infarction, ejection fraction 32%      Assessment/Plan   Problem List Items Addressed This Visit             ICD-10-CM    Ischemic dilated cardiomyopathy due to coronary artery disease - Primary I25.5, I25.10    Chronic systolic heart failure (CMS/[facility]) I50.22     1.  Chronic systolic congestive heart failure, NYHA II: This patient has CAD with prior myocardial infarction and ejection fraction 30 to 35%.  He is at increased risk for sudden cardiac death, and a primary prevention ICD is recommended for sudden death protection.  The device implantation procedure, risks, and alternatives were discussed.  He would benefit from a dual chamber pacing ICD because he is bradycardic at times, and he is not a candidate for cardiac resynchronization therapy.  Shared decision making literature was provided during today's visit.  The [place] SDM information was provided for the patient and his wife to review today.  They are in agreement with the recommendation to proceed with ICD insertion and this will be scheduled at [facility] in the near future.          Other Visit Diagnoses         Codes    Ischemic cardiomyopathy     I25.5            James C Ramicone, DO

## 2023-10-27 NOTE — ASSESSMENT & PLAN NOTE
1.  Chronic systolic congestive heart failure, NYHA II: This patient has CAD with prior myocardial infarction and ejection fraction 30 to 35%.  He is at increased risk for sudden cardiac death, and a primary prevention ICD is recommended for sudden death protection.  The device implantation procedure, risks, and alternatives were discussed.  He would benefit from a dual chamber pacing ICD because he is bradycardic at times, and he is not a candidate for cardiac resynchronization therapy.  Shared decision making literature was provided during today's visit.  The [facility] information was provided for the patient and his wife to review today.  They are in agreement with the recommendation to proceed with ICD insertion and this will be scheduled at [facility] in the near future.

## 2023-11-01 LAB
ATRIAL RATE: 94 BPM
P AXIS: 90 DEGREES
P OFFSET: 206 MS
P ONSET: 154 MS
PR INTERVAL: 150 MS
Q ONSET: 229 MS
QRS COUNT: 15 BEATS
QRS DURATION: 76 MS
QT INTERVAL: 378 MS
QTC CALCULATION(BAZETT): 472 MS
QTC FREDERICIA: 439 MS
R AXIS: 136 DEGREES
T AXIS: 39 DEGREES
T OFFSET: 418 MS
VENTRICULAR RATE: 94 BPM

## 2023-11-16 ENCOUNTER — APPOINTMENT (OUTPATIENT)
Dept: RADIOLOGY | Facility: HOSPITAL | Age: 63
End: 2023-11-16
Payer: MEDICARE

## 2023-11-16 ENCOUNTER — HOSPITAL ENCOUNTER (OUTPATIENT)
Dept: CARDIOLOGY | Facility: HOSPITAL | Age: 63
Discharge: HOME | End: 2023-11-16
Payer: MEDICARE

## 2023-11-16 ENCOUNTER — HOSPITAL ENCOUNTER (OUTPATIENT)
Facility: HOSPITAL | Age: 63
Discharge: HOME | End: 2023-11-17
Attending: INTERNAL MEDICINE | Admitting: NURSE PRACTITIONER
Payer: MEDICARE

## 2023-11-16 DIAGNOSIS — I25.5 ISCHEMIC DILATED CARDIOMYOPATHY DUE TO CORONARY ARTERY DISEASE: ICD-10-CM

## 2023-11-16 DIAGNOSIS — I50.22 CHRONIC SYSTOLIC HEART FAILURE (MULTI): ICD-10-CM

## 2023-11-16 DIAGNOSIS — I50.20 SYSTOLIC HEART FAILURE (MULTI): Primary | ICD-10-CM

## 2023-11-16 DIAGNOSIS — I25.10 ISCHEMIC DILATED CARDIOMYOPATHY DUE TO CORONARY ARTERY DISEASE: ICD-10-CM

## 2023-11-16 LAB — GLUCOSE BLD MANUAL STRIP-MCNC: 118 MG/DL (ref 74–99)

## 2023-11-16 PROCEDURE — C1721 AICD, DUAL CHAMBER: HCPCS | Performed by: INTERNAL MEDICINE

## 2023-11-16 PROCEDURE — 2500000001 HC RX 250 WO HCPCS SELF ADMINISTERED DRUGS (ALT 637 FOR MEDICARE OP): Performed by: NURSE PRACTITIONER

## 2023-11-16 PROCEDURE — 2500000001 HC RX 250 WO HCPCS SELF ADMINISTERED DRUGS (ALT 637 FOR MEDICARE OP): Performed by: INTERNAL MEDICINE

## 2023-11-16 PROCEDURE — C1892 INTRO/SHEATH,FIXED,PEEL-AWAY: HCPCS | Performed by: INTERNAL MEDICINE

## 2023-11-16 PROCEDURE — 7100000010 HC PHASE TWO TIME - EACH INCREMENTAL 1 MINUTE: Performed by: INTERNAL MEDICINE

## 2023-11-16 PROCEDURE — 99152 MOD SED SAME PHYS/QHP 5/>YRS: CPT | Performed by: INTERNAL MEDICINE

## 2023-11-16 PROCEDURE — 2750000001 HC OR 275 NO HCPCS: Performed by: INTERNAL MEDICINE

## 2023-11-16 PROCEDURE — 99153 MOD SED SAME PHYS/QHP EA: CPT | Performed by: INTERNAL MEDICINE

## 2023-11-16 PROCEDURE — 2720000007 HC OR 272 NO HCPCS: Performed by: INTERNAL MEDICINE

## 2023-11-16 PROCEDURE — 2500000004 HC RX 250 GENERAL PHARMACY W/ HCPCS (ALT 636 FOR OP/ED): Performed by: NURSE PRACTITIONER

## 2023-11-16 PROCEDURE — 93010 ELECTROCARDIOGRAM REPORT: CPT | Performed by: INTERNAL MEDICINE

## 2023-11-16 PROCEDURE — 2500000004 HC RX 250 GENERAL PHARMACY W/ HCPCS (ALT 636 FOR OP/ED): Performed by: INTERNAL MEDICINE

## 2023-11-16 PROCEDURE — C1777 LEAD, AICD, ENDO SINGLE COIL: HCPCS | Performed by: INTERNAL MEDICINE

## 2023-11-16 PROCEDURE — 71045 X-RAY EXAM CHEST 1 VIEW: CPT | Performed by: RADIOLOGY

## 2023-11-16 PROCEDURE — 2500000005 HC RX 250 GENERAL PHARMACY W/O HCPCS: Performed by: INTERNAL MEDICINE

## 2023-11-16 PROCEDURE — 33249 INSJ/RPLCMT DEFIB W/LEAD(S): CPT | Performed by: INTERNAL MEDICINE

## 2023-11-16 PROCEDURE — 7100000011 HC EXTENDED STAY RECOVERY HOURLY - NURSING UNIT

## 2023-11-16 PROCEDURE — 2780000003 HC OR 278 NO HCPCS: Performed by: INTERNAL MEDICINE

## 2023-11-16 PROCEDURE — 71045 X-RAY EXAM CHEST 1 VIEW: CPT

## 2023-11-16 PROCEDURE — 7100000009 HC PHASE TWO TIME - INITIAL BASE CHARGE: Performed by: INTERNAL MEDICINE

## 2023-11-16 PROCEDURE — 93005 ELECTROCARDIOGRAM TRACING: CPT

## 2023-11-16 PROCEDURE — 82947 ASSAY GLUCOSE BLOOD QUANT: CPT

## 2023-11-16 PROCEDURE — C1898 LEAD, PMKR, OTHER THAN TRANS: HCPCS | Performed by: INTERNAL MEDICINE

## 2023-11-16 DEVICE — LEAD 5076-52 CAPSUREFIX NOVUS US EN
Type: IMPLANTABLE DEVICE | Status: FUNCTIONAL
Brand: CAPSUREFIX® NOVUS

## 2023-11-16 DEVICE — LEAD 6935M62 DF4 ACTIVE SC US EN
Type: IMPLANTABLE DEVICE | Status: FUNCTIONAL
Brand: SPRINT QUATTRO SECURE S®

## 2023-11-16 DEVICE — ICD DDPA2D4 COBALT XT DR MRI DF4 USA
Type: IMPLANTABLE DEVICE | Status: FUNCTIONAL
Brand: COBALT™ XT DR MRI SURESCAN™

## 2023-11-16 RX ORDER — ATORVASTATIN CALCIUM 80 MG/1
80 TABLET, FILM COATED ORAL DAILY
Status: DISCONTINUED | OUTPATIENT
Start: 2023-11-16 | End: 2023-11-17 | Stop reason: HOSPADM

## 2023-11-16 RX ORDER — ACETAMINOPHEN 160 MG/5ML
650 SOLUTION ORAL EVERY 4 HOURS PRN
Status: DISCONTINUED | OUTPATIENT
Start: 2023-11-16 | End: 2023-11-17 | Stop reason: HOSPADM

## 2023-11-16 RX ORDER — ACETAMINOPHEN 650 MG/1
650 SUPPOSITORY RECTAL EVERY 4 HOURS PRN
Status: DISCONTINUED | OUTPATIENT
Start: 2023-11-16 | End: 2023-11-17 | Stop reason: HOSPADM

## 2023-11-16 RX ORDER — ASPIRIN 81 MG/1
81 TABLET ORAL DAILY
Status: DISCONTINUED | OUTPATIENT
Start: 2023-11-16 | End: 2023-11-17 | Stop reason: HOSPADM

## 2023-11-16 RX ORDER — ACETAMINOPHEN 325 MG/1
650 TABLET ORAL EVERY 4 HOURS PRN
Status: DISCONTINUED | OUTPATIENT
Start: 2023-11-16 | End: 2023-11-17 | Stop reason: HOSPADM

## 2023-11-16 RX ORDER — EZETIMIBE 10 MG/1
10 TABLET ORAL DAILY
Status: DISCONTINUED | OUTPATIENT
Start: 2023-11-16 | End: 2023-11-17 | Stop reason: HOSPADM

## 2023-11-16 RX ORDER — FENTANYL CITRATE 50 UG/ML
INJECTION, SOLUTION INTRAMUSCULAR; INTRAVENOUS AS NEEDED
Status: DISCONTINUED | OUTPATIENT
Start: 2023-11-16 | End: 2023-11-16 | Stop reason: HOSPADM

## 2023-11-16 RX ORDER — SODIUM CHLORIDE 9 MG/ML
5 INJECTION, SOLUTION INTRAVENOUS CONTINUOUS
Status: ACTIVE | OUTPATIENT
Start: 2023-11-16 | End: 2023-11-16

## 2023-11-16 RX ORDER — OXYCODONE HYDROCHLORIDE 5 MG/1
5 TABLET ORAL EVERY 6 HOURS PRN
Status: DISCONTINUED | OUTPATIENT
Start: 2023-11-16 | End: 2023-11-17 | Stop reason: HOSPADM

## 2023-11-16 RX ORDER — GABAPENTIN 400 MG/1
400 CAPSULE ORAL 3 TIMES DAILY
Status: DISCONTINUED | OUTPATIENT
Start: 2023-11-16 | End: 2023-11-17 | Stop reason: HOSPADM

## 2023-11-16 RX ORDER — SERTRALINE HYDROCHLORIDE 100 MG/1
100 TABLET, FILM COATED ORAL DAILY
Status: DISCONTINUED | OUTPATIENT
Start: 2023-11-16 | End: 2023-11-17 | Stop reason: HOSPADM

## 2023-11-16 RX ORDER — CARVEDILOL 3.12 MG/1
3.12 TABLET ORAL
Status: DISCONTINUED | OUTPATIENT
Start: 2023-11-16 | End: 2023-11-17 | Stop reason: HOSPADM

## 2023-11-16 RX ORDER — LIDOCAINE HYDROCHLORIDE 20 MG/ML
INJECTION, SOLUTION INFILTRATION; PERINEURAL AS NEEDED
Status: DISCONTINUED | OUTPATIENT
Start: 2023-11-16 | End: 2023-11-16 | Stop reason: HOSPADM

## 2023-11-16 RX ORDER — MIDAZOLAM HYDROCHLORIDE 1 MG/ML
INJECTION, SOLUTION INTRAMUSCULAR; INTRAVENOUS AS NEEDED
Status: DISCONTINUED | OUTPATIENT
Start: 2023-11-16 | End: 2023-11-16 | Stop reason: HOSPADM

## 2023-11-16 RX ORDER — SODIUM CHLORIDE 9 MG/ML
50 INJECTION, SOLUTION INTRAVENOUS CONTINUOUS
Status: DISCONTINUED | OUTPATIENT
Start: 2023-11-16 | End: 2023-11-16

## 2023-11-16 RX ORDER — HYDROCODONE BITARTRATE AND ACETAMINOPHEN 10; 325 MG/1; MG/1
1 TABLET ORAL EVERY 6 HOURS PRN
Status: DISCONTINUED | OUTPATIENT
Start: 2023-11-16 | End: 2023-11-17 | Stop reason: HOSPADM

## 2023-11-16 RX ORDER — TIZANIDINE 4 MG/1
2 TABLET ORAL EVERY 8 HOURS PRN
Status: DISCONTINUED | OUTPATIENT
Start: 2023-11-16 | End: 2023-11-17 | Stop reason: HOSPADM

## 2023-11-16 RX ADMIN — OXYCODONE HYDROCHLORIDE 5 MG: 5 TABLET ORAL at 21:43

## 2023-11-16 RX ADMIN — CARVEDILOL 3.12 MG: 3.12 TABLET, FILM COATED ORAL at 16:55

## 2023-11-16 RX ADMIN — VANCOMYCIN HYDROCHLORIDE 1250 MG: 1.25 INJECTION, POWDER, LYOPHILIZED, FOR SOLUTION INTRAVENOUS at 09:45

## 2023-11-16 RX ADMIN — SODIUM CHLORIDE 50 ML/HR: 9 INJECTION, SOLUTION INTRAVENOUS at 08:45

## 2023-11-16 RX ADMIN — GABAPENTIN 400 MG: 400 CAPSULE ORAL at 20:43

## 2023-11-16 RX ADMIN — VANCOMYCIN HYDROCHLORIDE 1250 MG: 1.25 INJECTION, POWDER, LYOPHILIZED, FOR SOLUTION INTRAVENOUS at 21:52

## 2023-11-16 RX ADMIN — ACETAMINOPHEN 650 MG: 325 TABLET ORAL at 20:43

## 2023-11-16 RX ADMIN — HYDROCODONE BITARTRATE AND ACETAMINOPHEN 1 TABLET: 10; 325 TABLET ORAL at 15:50

## 2023-11-16 SDOH — SOCIAL STABILITY: SOCIAL INSECURITY: WERE YOU ABLE TO COMPLETE ALL THE BEHAVIORAL HEALTH SCREENINGS?: YES

## 2023-11-16 SDOH — SOCIAL STABILITY: SOCIAL INSECURITY: DOES ANYONE TRY TO KEEP YOU FROM HAVING/CONTACTING OTHER FRIENDS OR DOING THINGS OUTSIDE YOUR HOME?: NO

## 2023-11-16 SDOH — SOCIAL STABILITY: SOCIAL INSECURITY: DO YOU FEEL ANYONE HAS EXPLOITED OR TAKEN ADVANTAGE OF YOU FINANCIALLY OR OF YOUR PERSONAL PROPERTY?: NO

## 2023-11-16 SDOH — SOCIAL STABILITY: SOCIAL INSECURITY: ARE THERE ANY APPARENT SIGNS OF INJURIES/BEHAVIORS THAT COULD BE RELATED TO ABUSE/NEGLECT?: NO

## 2023-11-16 SDOH — SOCIAL STABILITY: SOCIAL INSECURITY: ARE YOU OR HAVE YOU BEEN THREATENED OR ABUSED PHYSICALLY, EMOTIONALLY, OR SEXUALLY BY ANYONE?: NO

## 2023-11-16 SDOH — SOCIAL STABILITY: SOCIAL INSECURITY: DO YOU FEEL UNSAFE GOING BACK TO THE PLACE WHERE YOU ARE LIVING?: NO

## 2023-11-16 SDOH — SOCIAL STABILITY: SOCIAL INSECURITY: HAVE YOU HAD THOUGHTS OF HARMING ANYONE ELSE?: NO

## 2023-11-16 SDOH — SOCIAL STABILITY: SOCIAL INSECURITY: HAS ANYONE EVER THREATENED TO HURT YOUR FAMILY OR YOUR PETS?: NO

## 2023-11-16 SDOH — SOCIAL STABILITY: SOCIAL INSECURITY: ABUSE: ADULT

## 2023-11-16 ASSESSMENT — PAIN - FUNCTIONAL ASSESSMENT
PAIN_FUNCTIONAL_ASSESSMENT: 0-10

## 2023-11-16 ASSESSMENT — PAIN SCALES - GENERAL
PAINLEVEL_OUTOF10: 0 - NO PAIN
PAINLEVEL_OUTOF10: 7
PAINLEVEL_OUTOF10: 0 - NO PAIN
PAINLEVEL_OUTOF10: 7
PAINLEVEL_OUTOF10: 7
PAINLEVEL_OUTOF10: 0 - NO PAIN

## 2023-11-16 ASSESSMENT — COGNITIVE AND FUNCTIONAL STATUS - GENERAL
PATIENT BASELINE BEDBOUND: NO
MOBILITY SCORE: 23
DAILY ACTIVITIY SCORE: 24
DAILY ACTIVITIY SCORE: 24
CLIMB 3 TO 5 STEPS WITH RAILING: A LITTLE
CLIMB 3 TO 5 STEPS WITH RAILING: A LITTLE
MOBILITY SCORE: 23

## 2023-11-16 ASSESSMENT — LIFESTYLE VARIABLES
HOW OFTEN DO YOU HAVE A DRINK CONTAINING ALCOHOL: NEVER
AUDIT-C TOTAL SCORE: 0
SKIP TO QUESTIONS 9-10: 1
HOW MANY STANDARD DRINKS CONTAINING ALCOHOL DO YOU HAVE ON A TYPICAL DAY: PATIENT DOES NOT DRINK
AUDIT-C TOTAL SCORE: 0
HOW OFTEN DO YOU HAVE 6 OR MORE DRINKS ON ONE OCCASION: NEVER

## 2023-11-16 ASSESSMENT — ACTIVITIES OF DAILY LIVING (ADL)
HEARING - LEFT EAR: FUNCTIONAL
WALKS IN HOME: INDEPENDENT
BATHING: INDEPENDENT
ADEQUATE_TO_COMPLETE_ADL: YES
LACK_OF_TRANSPORTATION: NO
JUDGMENT_ADEQUATE_SAFELY_COMPLETE_DAILY_ACTIVITIES: YES
GROOMING: INDEPENDENT
TOILETING: INDEPENDENT
FEEDING YOURSELF: INDEPENDENT
HEARING - RIGHT EAR: FUNCTIONAL
DRESSING YOURSELF: INDEPENDENT
PATIENT'S MEMORY ADEQUATE TO SAFELY COMPLETE DAILY ACTIVITIES?: YES

## 2023-11-16 ASSESSMENT — PAIN DESCRIPTION - ORIENTATION: ORIENTATION: LEFT

## 2023-11-16 ASSESSMENT — PAIN DESCRIPTION - LOCATION: LOCATION: CHEST

## 2023-11-16 NOTE — CARE PLAN
The patient's goals for the shift include      The clinical goals for the shift include vitals WDL    Problem: Diabetes  Goal: Achieve decreasing blood glucose levels by end of shift  Outcome: Progressing  Goal: Increase stability of blood glucose readings by end of shift  Outcome: Progressing  Goal: Decrease in ketones present in urine by end of shift  Outcome: Progressing  Goal: Maintain electrolyte levels within acceptable range throughout shift  Outcome: Progressing  Goal: Maintain glucose levels >70mg/dl to <250mg/dl throughout shift  Outcome: Progressing  Goal: No changes in neurological exam by end of shift  Outcome: Progressing  Goal: Learn about and adhere to nutrition recommendations by end of shift  Outcome: Progressing  Goal: Vital signs within normal range for age by end of shift  Outcome: Progressing  Goal: Increase self care and/or family involovement by end of shift  Outcome: Progressing  Goal: Receive DSME education by end of shift  Outcome: Progressing

## 2023-11-16 NOTE — Clinical Note
The DIFIBULATOR, ICD COBALT XT DR MRI IS1 DF4 - [id number] - [id number] device was inserted. The leads were placed into the connector and visually verified to be in correct position.

## 2023-11-16 NOTE — DISCHARGE INSTRUCTIONS
ICD incision care instructions from Dr. Ramicone:    Keep bandage on incision until seen in the office in 1 week.  May shower, but keep the bandage as dry as possible.  Keep left arm below shoulder level for 2 weeks.  Follow-up at Dr. Ramicone's office for the incision check on Wednesday, November 22, 2023 at 10:30 AM.  [address] [place] [id number].

## 2023-11-17 VITALS
TEMPERATURE: 96.3 F | RESPIRATION RATE: 22 BRPM | SYSTOLIC BLOOD PRESSURE: 107 MMHG | BODY MASS INDEX: 22.47 KG/M2 | OXYGEN SATURATION: 90 % | WEIGHT: 169.53 LBS | DIASTOLIC BLOOD PRESSURE: 58 MMHG | HEIGHT: 73 IN | HEART RATE: 50 BPM

## 2023-11-17 PROCEDURE — 2500000004 HC RX 250 GENERAL PHARMACY W/ HCPCS (ALT 636 FOR OP/ED): Performed by: NURSE PRACTITIONER

## 2023-11-17 PROCEDURE — 99238 HOSP IP/OBS DSCHRG MGMT 30/<: CPT

## 2023-11-17 PROCEDURE — 2500000001 HC RX 250 WO HCPCS SELF ADMINISTERED DRUGS (ALT 637 FOR MEDICARE OP): Performed by: NURSE PRACTITIONER

## 2023-11-17 PROCEDURE — 7100000011 HC EXTENDED STAY RECOVERY HOURLY - NURSING UNIT

## 2023-11-17 PROCEDURE — 2500000002 HC RX 250 W HCPCS SELF ADMINISTERED DRUGS (ALT 637 FOR MEDICARE OP, ALT 636 FOR OP/ED): Mod: MUE | Performed by: NURSE PRACTITIONER

## 2023-11-17 RX ADMIN — CARVEDILOL 3.12 MG: 3.12 TABLET, FILM COATED ORAL at 08:40

## 2023-11-17 RX ADMIN — SERTRALINE HYDROCHLORIDE 100 MG: 100 TABLET ORAL at 08:40

## 2023-11-17 RX ADMIN — OXYCODONE HYDROCHLORIDE 5 MG: 5 TABLET ORAL at 08:40

## 2023-11-17 RX ADMIN — GABAPENTIN 400 MG: 400 CAPSULE ORAL at 08:40

## 2023-11-17 RX ADMIN — EZETIMIBE 10 MG: 10 TABLET ORAL at 08:39

## 2023-11-17 RX ADMIN — EMPAGLIFLOZIN 10 MG: 10 TABLET, FILM COATED ORAL at 08:39

## 2023-11-17 RX ADMIN — ATORVASTATIN CALCIUM 80 MG: 80 TABLET, FILM COATED ORAL at 08:40

## 2023-11-17 RX ADMIN — OXYCODONE HYDROCHLORIDE 5 MG: 5 TABLET ORAL at 03:40

## 2023-11-17 RX ADMIN — ASPIRIN 81 MG: 81 TABLET, COATED ORAL at 09:00

## 2023-11-17 ASSESSMENT — PAIN SCALES - GENERAL
PAINLEVEL_OUTOF10: 7
PAINLEVEL_OUTOF10: 2
PAINLEVEL_OUTOF10: 6
PAINLEVEL_OUTOF10: 6

## 2023-11-17 ASSESSMENT — PAIN DESCRIPTION - LOCATION: LOCATION: CHEST

## 2023-11-17 ASSESSMENT — PAIN - FUNCTIONAL ASSESSMENT: PAIN_FUNCTIONAL_ASSESSMENT: 0-10

## 2023-11-17 ASSESSMENT — PAIN DESCRIPTION - ORIENTATION: ORIENTATION: UPPER;LEFT

## 2023-11-17 ASSESSMENT — ACTIVITIES OF DAILY LIVING (ADL): LACK_OF_TRANSPORTATION: NO

## 2023-11-17 NOTE — PROGRESS NOTES
REMOTE COVERAGE- Called into pt room, role of TCC explained. Address, telephone, contact number confirmed. Pt independent in all care, no recent falls, no DME that he is currently using. Has used in past after back surgery. Plan- home no needs. cT will follow.    11/17/23 1054   Discharge Planning   Living Arrangements Spouse/significant other   Support Systems Spouse/significant other   Assistance Needed independent in bathing, dressing, cooking, cleaning, driving. Denies recent falls, no DME.   Type of Residence Private residence   Number of Stairs to Enter Residence 4   Number of Stairs Within Residence 20  (remains on main level of 2 story home.)   Do you have animals or pets at home? Yes   Type of Animals or Pets cat   Home or Post Acute Services None   Patient expects to be discharged to: home- no needs   Does the patient need discharge transport arranged? No   Financial Resource Strain   How hard is it for you to pay for the very basics like food, housing, medical care, and heating? Not hard   Housing Stability   In the last 12 months, was there a time when you were not able to pay the mortgage or rent on time? N   In the last 12 months, how many places have you lived? 1   In the last 12 months, was there a time when you did not have a steady place to sleep or slept in a shelter (including now)? N   Transportation Needs   In the past 12 months, has lack of transportation kept you from medical appointments or from getting medications? no   In the past 12 months, has lack of transportation kept you from meetings, work, or from getting things needed for daily living? No

## 2023-11-17 NOTE — DISCHARGE SUMMARY
Discharge Diagnosis  Systolic heart failure (CMS/HCC)    Issues Requiring Follow-Up  Device check scheduled.     Test Results Pending At Discharge  Pending Labs       No current pending labs.            Hospital Course  63 year old male with PMH of CAD s/p CABG (LIMA to LAD, SVG to diag, SVG to diag, SVG to OM, SVG to PDA) October 2026, HFrEF 30-35%, HLD, GRACE presented for primary prevention ICD insertion. He is now s/p dual chamber ICD insertion on 11/16 with Dr. Ramicone. He spent the night with monitoring on telemetry without any acute events. This morning, he had a satisfactory device check. Endorsing mild pain on left chest site. He is being discharged with follow-up scheduled for incision check with Dr. Ramicone's office.     Pertinent Physical Exam At Time of Discharge  Physical Exam  Constitutional:       Appearance: Normal appearance.   HENT:      Mouth/Throat:      Mouth: Mucous membranes are moist.   Eyes:      Conjunctiva/sclera: Conjunctivae normal.   Cardiovascular:      Pulses: Normal pulses.   Pulmonary:      Effort: Pulmonary effort is normal.   Abdominal:      General: Bowel sounds are normal.   Musculoskeletal:      Right lower leg: No edema.      Left lower leg: No edema.   Skin:     General: Skin is warm and dry.      Capillary Refill: Capillary refill takes less than 2 seconds.      Comments: No swelling, hematoma. Left chest dressing intact, no bleeding.    Neurological:      Mental Status: He is alert.         Home Medications     Medication List      CONTINUE taking these medications     aspirin 81 mg EC tablet   atorvastatin 80 mg tablet; Commonly known as: Lipitor   carvedilol 3.125 mg tablet; Commonly known as: Coreg   cholecalciferol 25 MCG (1000 UT) capsule; Commonly known as: Vitamin D-3   empagliflozin 10 mg; Commonly known as: Jardiance   ezetimibe 10 mg tablet; Commonly known as: Zetia   gabapentin 400 mg capsule; Commonly known as: Neurontin   OneTouch Ultra Test strip; Generic  drug: blood sugar diagnostic   sertraline 100 mg tablet; Commonly known as: Zoloft   tiZANidine 2 mg tablet; Commonly known as: Zanaflex       Outpatient Follow-Up  Future Appointments   Date Time Provider Department [place]   11/22/2023 10:30 AM CARDIOLOGY  [id number] CARD1 NURSE [id number] [place]   3/28/2024 10:00 AM Poncho Benitez DO [id number] [place]     I spent 25 minutes in the professional and overall care of this patient.      Daisy Guillen, APRN-CNP

## 2023-11-17 NOTE — CARE PLAN
The patient's goals for the shift include      The clinical goals for the shift include vitals WDL    Over the shift, the patient did not make progress toward the following goals. Barriers to progression include ***. Recommendations to address these barriers include ***.

## 2023-11-20 ENCOUNTER — HOSPITAL ENCOUNTER (OUTPATIENT)
Dept: CARDIOLOGY | Facility: HOSPITAL | Age: 63
Discharge: HOME | End: 2023-11-20
Payer: MEDICARE

## 2023-11-20 LAB
ATRIAL RATE: 50 BPM
ATRIAL RATE: 52 BPM
P AXIS: 52 DEGREES
P AXIS: 75 DEGREES
P OFFSET: 179 MS
P OFFSET: 192 MS
P ONSET: 113 MS
P ONSET: 129 MS
PR INTERVAL: 200 MS
PR INTERVAL: 240 MS
Q ONSET: 213 MS
Q ONSET: 229 MS
QRS COUNT: 9 BEATS
QRS COUNT: 9 BEATS
QRS DURATION: 80 MS
QRS DURATION: 82 MS
QT INTERVAL: 450 MS
QT INTERVAL: 458 MS
QTC CALCULATION(BAZETT): 417 MS
QTC CALCULATION(BAZETT): 418 MS
QTC FREDERICIA: 429 MS
QTC FREDERICIA: 431 MS
R AXIS: -64 DEGREES
R AXIS: -65 DEGREES
T AXIS: 65 DEGREES
T AXIS: 96 DEGREES
T OFFSET: 442 MS
T OFFSET: 454 MS
VENTRICULAR RATE: 50 BPM
VENTRICULAR RATE: 52 BPM

## 2023-11-20 PROCEDURE — 93005 ELECTROCARDIOGRAM TRACING: CPT

## 2023-11-21 ENCOUNTER — HOSPITAL ENCOUNTER (OUTPATIENT)
Dept: CARDIOLOGY | Facility: CLINIC | Age: 63
Discharge: HOME | End: 2023-11-21
Payer: MEDICARE

## 2023-11-21 DIAGNOSIS — Z95.810 PRESENCE OF AUTOMATIC CARDIOVERTER/DEFIBRILLATOR (AICD): ICD-10-CM

## 2023-11-21 DIAGNOSIS — I50.20 SYSTOLIC HEART FAILURE, UNSPECIFIED HF CHRONICITY (MULTI): ICD-10-CM

## 2023-11-21 DIAGNOSIS — I47.20 VT (VENTRICULAR TACHYCARDIA) (MULTI): ICD-10-CM

## 2023-11-22 ENCOUNTER — CLINICAL SUPPORT (OUTPATIENT)
Dept: CARDIOLOGY | Facility: CLINIC | Age: 63
End: 2023-11-22
Payer: MEDICARE

## 2023-11-22 NOTE — PROGRESS NOTES
Pt presented for site check following ICD implantation with Dr Ramicone on 11/16/23. Pt has no complaints. Removed surgical bandage, steri strips are intact. Instructed pt to leave site open to air and allow the steri strips to fall off naturally. ICD site is free of redness, warmth, bleeding or drainage. The device is easily palpable. Scant bruising, almost completely resolved. Scant swelling. Pt will monitor for signs/symptoms of infection and contact the office with any concerns. Answered all questions. Follow up appts given to pt.

## 2023-12-19 LAB
ATRIAL RATE: 64 BPM
P AXIS: 259 DEGREES
PR INTERVAL: 129 MS
Q ONSET: 252 MS
QRS COUNT: 10 BEATS
QRS DURATION: 97 MS
QT INTERVAL: 488 MS
QTC CALCULATION(BAZETT): 504 MS
QTC FREDERICIA: 498 MS
R AXIS: 36 DEGREES
T AXIS: 95 DEGREES
T OFFSET: 496 MS
VENTRICULAR RATE: 64 BPM

## 2023-12-21 ENCOUNTER — HOSPITAL ENCOUNTER (OUTPATIENT)
Dept: CARDIOLOGY | Facility: CLINIC | Age: 63
Discharge: HOME | End: 2023-12-21
Payer: MEDICARE

## 2023-12-21 DIAGNOSIS — I50.20 SYSTOLIC HEART FAILURE, UNSPECIFIED HF CHRONICITY (MULTI): ICD-10-CM

## 2023-12-21 PROCEDURE — 93283 PRGRMG EVAL IMPLANTABLE DFB: CPT

## 2023-12-21 PROCEDURE — 93290 INTERROG DEV EVAL ICPMS IP: CPT | Performed by: INTERNAL MEDICINE

## 2023-12-21 PROCEDURE — 93283 PRGRMG EVAL IMPLANTABLE DFB: CPT | Performed by: INTERNAL MEDICINE

## 2023-12-27 DIAGNOSIS — Z95.810 PRESENCE OF AUTOMATIC CARDIOVERTER/DEFIBRILLATOR (AICD): ICD-10-CM

## 2023-12-27 DIAGNOSIS — I47.20 VT (VENTRICULAR TACHYCARDIA) (MULTI): ICD-10-CM

## 2024-02-13 PROBLEM — M19.90 OSTEOARTHRITIS: Status: ACTIVE | Noted: 2023-10-31

## 2024-02-13 PROBLEM — M54.50 CHRONIC MIDLINE LOW BACK PAIN WITHOUT SCIATICA: Status: ACTIVE | Noted: 2024-01-05

## 2024-02-13 PROBLEM — E78.5 DYSLIPIDEMIA: Status: ACTIVE | Noted: 2023-10-31

## 2024-02-13 PROBLEM — G47.8 ABNORMAL RAPID EYE MOVEMENT SLEEP: Status: ACTIVE | Noted: 2024-02-13

## 2024-02-13 PROBLEM — G47.52 REM SLEEP BEHAVIOR DISORDER: Status: ACTIVE | Noted: 2024-02-13

## 2024-02-13 PROBLEM — G89.29 CHRONIC MIDLINE LOW BACK PAIN WITHOUT SCIATICA: Status: ACTIVE | Noted: 2024-01-05

## 2024-02-13 PROBLEM — R06.83 SNORING: Status: ACTIVE | Noted: 2024-02-13

## 2024-02-13 PROBLEM — I25.10 ARTERIOSCLEROSIS OF CORONARY ARTERY: Status: ACTIVE | Noted: 2023-03-06

## 2024-02-13 PROBLEM — B34.9 NONSPECIFIC SYNDROME SUGGESTIVE OF VIRAL ILLNESS: Status: ACTIVE | Noted: 2024-01-05

## 2024-02-13 PROBLEM — Z95.810 IMPLANTABLE CARDIOVERTER-DEFIBRILLATOR (ICD) IN SITU: Status: ACTIVE | Noted: 2024-02-13

## 2024-03-18 ENCOUNTER — HOSPITAL ENCOUNTER (OUTPATIENT)
Dept: CARDIOLOGY | Facility: CLINIC | Age: 64
Discharge: HOME | End: 2024-03-18
Payer: MEDICARE

## 2024-03-18 DIAGNOSIS — Z95.810 PRESENCE OF AUTOMATIC CARDIOVERTER/DEFIBRILLATOR (AICD): ICD-10-CM

## 2024-03-18 DIAGNOSIS — I47.20 VT (VENTRICULAR TACHYCARDIA) (MULTI): ICD-10-CM

## 2024-03-21 ENCOUNTER — APPOINTMENT (OUTPATIENT)
Dept: CARDIOLOGY | Facility: CLINIC | Age: 64
End: 2024-03-21
Payer: MEDICARE

## 2024-03-25 PROBLEM — G47.52 REM SLEEP BEHAVIOR DISORDER: Status: RESOLVED | Noted: 2024-02-13 | Resolved: 2024-03-25

## 2024-03-25 PROBLEM — E78.5 DYSLIPIDEMIA: Status: RESOLVED | Noted: 2023-10-31 | Resolved: 2024-03-25

## 2024-03-25 PROBLEM — I50.20 SYSTOLIC HEART FAILURE (MULTI): Status: RESOLVED | Noted: 2023-11-16 | Resolved: 2024-03-25

## 2024-03-25 PROBLEM — I42.0 ISCHEMIC DILATED CARDIOMYOPATHY (MULTI): Status: ACTIVE | Noted: 2023-10-17

## 2024-03-28 ENCOUNTER — OFFICE VISIT (OUTPATIENT)
Dept: CARDIOLOGY | Facility: CLINIC | Age: 64
End: 2024-03-28
Payer: MEDICARE

## 2024-03-28 VITALS
SYSTOLIC BLOOD PRESSURE: 85 MMHG | OXYGEN SATURATION: 91 % | BODY MASS INDEX: 22.96 KG/M2 | HEART RATE: 87 BPM | WEIGHT: 174 LBS | DIASTOLIC BLOOD PRESSURE: 55 MMHG

## 2024-03-28 DIAGNOSIS — E78.00 PURE HYPERCHOLESTEROLEMIA: ICD-10-CM

## 2024-03-28 DIAGNOSIS — I95.9 HYPOTENSION, UNSPECIFIED HYPOTENSION TYPE: ICD-10-CM

## 2024-03-28 DIAGNOSIS — Z95.810 AICD (AUTOMATIC CARDIOVERTER/DEFIBRILLATOR) PRESENT: ICD-10-CM

## 2024-03-28 DIAGNOSIS — I25.10 CORONARY ARTERY DISEASE INVOLVING NATIVE CORONARY ARTERY OF NATIVE HEART, UNSPECIFIED WHETHER ANGINA PRESENT: ICD-10-CM

## 2024-03-28 DIAGNOSIS — Z95.1 S/P CABG X 4: ICD-10-CM

## 2024-03-28 DIAGNOSIS — I42.0 ISCHEMIC DILATED CARDIOMYOPATHY (MULTI): ICD-10-CM

## 2024-03-28 DIAGNOSIS — G47.33 OBSTRUCTIVE SLEEP APNEA SYNDROME: ICD-10-CM

## 2024-03-28 DIAGNOSIS — I25.5 ISCHEMIC DILATED CARDIOMYOPATHY (MULTI): ICD-10-CM

## 2024-03-28 DIAGNOSIS — I77.9 CAROTID ARTERY DISEASE, UNSPECIFIED LATERALITY, UNSPECIFIED TYPE (CMS-HCC): Primary | ICD-10-CM

## 2024-03-28 PROCEDURE — 3074F SYST BP LT 130 MM HG: CPT | Performed by: INTERNAL MEDICINE

## 2024-03-28 PROCEDURE — 1160F RVW MEDS BY RX/DR IN RCRD: CPT | Performed by: INTERNAL MEDICINE

## 2024-03-28 PROCEDURE — 1036F TOBACCO NON-USER: CPT | Performed by: INTERNAL MEDICINE

## 2024-03-28 PROCEDURE — 3078F DIAST BP <80 MM HG: CPT | Performed by: INTERNAL MEDICINE

## 2024-03-28 PROCEDURE — 1159F MED LIST DOCD IN RCRD: CPT | Performed by: INTERNAL MEDICINE

## 2024-03-28 PROCEDURE — 99214 OFFICE O/P EST MOD 30 MIN: CPT | Performed by: INTERNAL MEDICINE

## 2024-03-28 NOTE — PROGRESS NOTES
Chief Complaint:   Follow-up (Patient is here for 6 month follow up)     History Of Present Illness:    Max Rivera is a 63 y.o. male presenting with cardiovascular disease.  Had AICD place now  Patient denies chest pain/SOB/palpitations/dizziness/lightheadedness/edema/claudication    No regular exercise       Last Recorded Vitals:  Vitals:    03/28/24 0946 03/28/24 1018   BP: 87/63 85/55   BP Location: Right arm    Patient Position: Sitting    BP Cuff Size: Adult    Pulse: 87    SpO2: 91%    Weight: 78.9 kg (174 lb)             Allergies:  Penicillin    Outpatient Medications:  Current Outpatient Medications   Medication Instructions    aspirin 81 mg EC tablet 1 tablet, oral, Daily    atorvastatin (LIPITOR) 80 mg, oral, Daily    carvedilol (COREG) 3.125 mg, oral, 2 times daily with meals    cholecalciferol (VITAMIN D-3) 25 mcg, oral, Daily    empagliflozin (JARDIANCE) 10 mg, oral, Daily    ezetimibe (ZETIA) 10 mg, oral, Daily    gabapentin (NEURONTIN) 400 mg, oral, 3 times daily    OneTouch Ultra Test strip Use as directed    sertraline (Zoloft) 100 mg tablet 1 tablet, oral, Daily    tiZANidine (ZANAFLEX) 2 mg, oral, Every 8 hours PRN       Physical Exam:  Constitutional:       Appearance: Healthy appearance. Not in distress.   Neck:      Vascular: No JVR. JVD normal.   Pulmonary:      Effort: Pulmonary effort is normal.      Breath sounds: Normal breath sounds. No wheezing. No rhonchi. No rales.   Chest:      Chest wall: Not tender to palpatation.   Cardiovascular:      PMI at left midclavicular line. Normal rate. Regular rhythm. Normal S1. Normal S2.       Murmurs: There is no murmur.      No gallop.  No click. No rub.   Pulses:     Intact distal pulses.   Edema:     Peripheral edema absent.   Abdominal:      General: Bowel sounds are normal.      Palpations: Abdomen is soft.      Tenderness: There is no abdominal tenderness.   Musculoskeletal: Normal range of motion.         General: No tenderness. Skin:      General: Skin is warm and dry.   Neurological:      General: No focal deficit present.      Mental Status: Alert and oriented to person, place and time.          Last Labs:  CBC -  Lab Results   Component Value Date    WBC 9.2 10/22/2023    HGB 14.9 10/22/2023    HCT 44.9 10/22/2023    MCV 98 10/22/2023     10/22/2023       CMP -  Lab Results   Component Value Date    CALCIUM 9.2 10/22/2023    PROT 6.9 10/22/2023    ALBUMIN 3.9 10/22/2023    AST 86 (H) 10/22/2023    ALT 10 10/22/2023    ALKPHOS 69 10/22/2023    BILITOT 0.5 10/22/2023       LIPID PANEL -   Lab Results   Component Value Date    CHOL 109 04/27/2023    TRIG 94 04/27/2023    HDL 27.0 (A) 04/27/2023    CHHDL 4.0 04/27/2023    LDLF 63 04/27/2023    VLDL 19 04/27/2023   3/2024   DXEXP=366  LDL=67    RENAL FUNCTION PANEL -   Lab Results   Component Value Date    GLUCOSE 94 10/22/2023     10/22/2023    K 3.7 10/22/2023     10/22/2023    CO2 23 10/22/2023    ANIONGAP 15 10/22/2023    BUN 21 10/22/2023    CREATININE 0.84 10/22/2023    GFRMALE >90 04/27/2023    CALCIUM 9.2 10/22/2023    ALBUMIN 3.9 10/22/2023        Lab Results   Component Value Date     (H) 10/30/2018    HGBA1C 6.4 (H) 03/22/2024           Lab review: I have personally reviewed the laboratory result(s)       Problem List Items Addressed This Visit       Carotid artery disease (CMS/HCC) - Primary    Overview     Stable moderate right carotid dz and mild left dz per 4/2022 duplex   On ASA / statin   9/2023 duplex with Less than 50% stenosis B/L           S/P CABG x 4    Overview     10/15/2016 LIMA-LAD, SVG-D, SVG-OM, SVG-PDA   Suspect SVG-OM occluded per 9/2023 cardiac MRI results          Pure hypercholesterolemia    Overview     10/2018 lipids with LDL=75, thus added Zetia   3/2021 LDL=61   5/2023 LDL=63   3/2024 LDL=67  On high-intensity statin   Follow heart-healthy diet          Obstructive sleep apnea syndrome    Overview     Hz of sleep apnea but not wearing  CPAP (recalled)  Denies day time drowsiness  Did lose weight  Also off Morphine         Ischemic dilated cardiomyopathy (CMS/HCC)    Overview     Pre CABG EF was 35%. EF=37% per 11/2018 stress test. EF 40-45% with apical hypokinesis per 1/2019 echo.  EF improved to 50-55% per 7/2022 echo, however 3/2023 stress test with EF=32% with extensive apical MI and INFLAT MI   Subsequent 9/2023 cardiac MRI with EF=35% with ANT MI and INFLAT MI (minimal viability) - suspect patient occluded graft to OM somewhere between 2018 and 2023 stress test.  As no angina and minimal viability no cath done.  Has AICD  Patient with no s/s of CHF.   On beta blocker low dose / Jardiance - no ARB / ACE / ARNI as BP low         Hypotension    Overview     BP still low but better off Lisinopril         CAD (coronary artery disease)    Overview     2005 ANT MI with LAD/LCX stents  S/P 10/15/2016 CABG: LIMA-LAD, SVG-D, SVG-OM, SVG-PDA  Pt with remote ANT MI with LAD / LCX stents presented to hospital 10/5/2016 with NSTEMI.  Cath with severe multivessel dz and moderate LV systolic dysfunction, thus underwent CABG.   11/2018 stress test with mildly decreased exercise tolerance and prior MI but no ischemia.  3/2023 stress test with multiple WMA / decreased EF  Based on 9/2023 cardiac MRI, suspect SVG-OM occluded  No angina.  On ASA / statin / beta blocker  Follow         AICD (automatic cardioverter/defibrillator) present    Overview     Medtronic Cobalt placed 11/2023  Follows with EP                  Poncho Benitez DO

## 2024-05-26 DIAGNOSIS — E78.00 PURE HYPERCHOLESTEROLEMIA, UNSPECIFIED: ICD-10-CM

## 2024-05-26 DIAGNOSIS — I77.9 CAROTID ARTERY DISEASE, UNSPECIFIED LATERALITY, UNSPECIFIED TYPE (CMS-HCC): ICD-10-CM

## 2024-05-26 DIAGNOSIS — I25.10 CORONARY ARTERY DISEASE INVOLVING NATIVE CORONARY ARTERY OF NATIVE HEART, UNSPECIFIED WHETHER ANGINA PRESENT: ICD-10-CM

## 2024-05-28 RX ORDER — ATORVASTATIN CALCIUM 80 MG/1
80 TABLET, FILM COATED ORAL DAILY
Qty: 90 TABLET | Refills: 1 | Status: SHIPPED | OUTPATIENT
Start: 2024-08-12

## 2024-06-19 ENCOUNTER — HOSPITAL ENCOUNTER (OUTPATIENT)
Dept: CARDIOLOGY | Facility: CLINIC | Age: 64
Discharge: HOME | End: 2024-06-19
Payer: MEDICARE

## 2024-06-19 DIAGNOSIS — I47.20 VT (VENTRICULAR TACHYCARDIA) (MULTI): ICD-10-CM

## 2024-06-19 DIAGNOSIS — Z95.810 AICD (AUTOMATIC CARDIOVERTER/DEFIBRILLATOR) PRESENT: ICD-10-CM

## 2024-06-19 PROCEDURE — 93296 REM INTERROG EVL PM/IDS: CPT

## 2024-06-19 PROCEDURE — 93295 DEV INTERROG REMOTE 1/2/MLT: CPT | Performed by: INTERNAL MEDICINE

## 2024-06-20 ENCOUNTER — APPOINTMENT (OUTPATIENT)
Dept: CARDIOLOGY | Facility: CLINIC | Age: 64
End: 2024-06-20
Payer: MEDICARE

## 2024-08-02 DIAGNOSIS — I42.0 DILATED CARDIOMYOPATHY (MULTI): ICD-10-CM

## 2024-08-02 DIAGNOSIS — I25.5 ISCHEMIC CARDIOMYOPATHY: ICD-10-CM

## 2024-08-02 RX ORDER — EMPAGLIFLOZIN 10 MG/1
10 TABLET, FILM COATED ORAL
Qty: 90 TABLET | Refills: 1 | Status: SHIPPED | OUTPATIENT
Start: 2024-08-02

## 2024-08-19 ENCOUNTER — APPOINTMENT (OUTPATIENT)
Dept: GASTROENTEROLOGY | Facility: CLINIC | Age: 64
End: 2024-08-19
Payer: MEDICARE

## 2024-08-26 DIAGNOSIS — E78.00 PURE HYPERCHOLESTEROLEMIA, UNSPECIFIED: ICD-10-CM

## 2024-08-27 RX ORDER — EZETIMIBE 10 MG/1
10 TABLET ORAL DAILY
Qty: 90 TABLET | Refills: 1 | Status: SHIPPED | OUTPATIENT
Start: 2024-10-11

## 2024-09-17 ENCOUNTER — HOSPITAL ENCOUNTER (OUTPATIENT)
Dept: CARDIOLOGY | Facility: CLINIC | Age: 64
Discharge: HOME | End: 2024-09-17
Payer: MEDICARE

## 2024-09-17 DIAGNOSIS — I47.20 VT (VENTRICULAR TACHYCARDIA) (MULTI): ICD-10-CM

## 2024-09-17 DIAGNOSIS — Z95.810 PRESENCE OF AUTOMATIC CARDIOVERTER/DEFIBRILLATOR (AICD): ICD-10-CM

## 2024-09-19 ENCOUNTER — APPOINTMENT (OUTPATIENT)
Dept: CARDIOLOGY | Facility: CLINIC | Age: 64
End: 2024-09-19
Payer: MEDICARE

## 2024-10-02 ENCOUNTER — APPOINTMENT (OUTPATIENT)
Dept: CARDIOLOGY | Facility: CLINIC | Age: 64
End: 2024-10-02
Payer: MEDICARE

## 2024-11-05 ENCOUNTER — APPOINTMENT (OUTPATIENT)
Dept: CARDIOLOGY | Facility: CLINIC | Age: 64
End: 2024-11-05
Payer: MEDICARE

## 2024-11-05 VITALS
HEART RATE: 93 BPM | WEIGHT: 182 LBS | BODY MASS INDEX: 24.65 KG/M2 | SYSTOLIC BLOOD PRESSURE: 90 MMHG | HEIGHT: 72 IN | DIASTOLIC BLOOD PRESSURE: 56 MMHG | OXYGEN SATURATION: 97 %

## 2024-11-05 DIAGNOSIS — E78.00 PURE HYPERCHOLESTEROLEMIA: ICD-10-CM

## 2024-11-05 DIAGNOSIS — R06.02 SHORTNESS OF BREATH: ICD-10-CM

## 2024-11-05 DIAGNOSIS — I25.10 CORONARY ARTERY DISEASE INVOLVING NATIVE CORONARY ARTERY OF NATIVE HEART, UNSPECIFIED WHETHER ANGINA PRESENT: ICD-10-CM

## 2024-11-05 DIAGNOSIS — Z95.1 S/P CABG X 4: ICD-10-CM

## 2024-11-05 DIAGNOSIS — I25.5 ISCHEMIC DILATED CARDIOMYOPATHY (MULTI): ICD-10-CM

## 2024-11-05 DIAGNOSIS — Z95.810 AICD (AUTOMATIC CARDIOVERTER/DEFIBRILLATOR) PRESENT: ICD-10-CM

## 2024-11-05 DIAGNOSIS — I25.10 CORONARY ARTERY DISEASE INVOLVING NATIVE CORONARY ARTERY OF NATIVE HEART, UNSPECIFIED WHETHER ANGINA PRESENT: Primary | ICD-10-CM

## 2024-11-05 DIAGNOSIS — R07.9 CHEST PAIN, UNSPECIFIED TYPE: ICD-10-CM

## 2024-11-05 DIAGNOSIS — G47.33 OBSTRUCTIVE SLEEP APNEA SYNDROME: ICD-10-CM

## 2024-11-05 DIAGNOSIS — I95.9 HYPOTENSION, UNSPECIFIED HYPOTENSION TYPE: ICD-10-CM

## 2024-11-05 DIAGNOSIS — I42.0 ISCHEMIC DILATED CARDIOMYOPATHY (MULTI): ICD-10-CM

## 2024-11-05 LAB
ATRIAL RATE: 75 BPM
P AXIS: 81 DEGREES
P OFFSET: 187 MS
P ONSET: 116 MS
PR INTERVAL: 218 MS
Q ONSET: 223 MS
QRS COUNT: 12 BEATS
QRS DURATION: 82 MS
QT INTERVAL: 394 MS
QTC CALCULATION(BAZETT): 439 MS
QTC FREDERICIA: 424 MS
R AXIS: 136 DEGREES
T AXIS: 44 DEGREES
T OFFSET: 420 MS
VENTRICULAR RATE: 75 BPM

## 2024-11-05 PROCEDURE — 1160F RVW MEDS BY RX/DR IN RCRD: CPT | Performed by: INTERNAL MEDICINE

## 2024-11-05 PROCEDURE — 99214 OFFICE O/P EST MOD 30 MIN: CPT | Performed by: INTERNAL MEDICINE

## 2024-11-05 PROCEDURE — 3008F BODY MASS INDEX DOCD: CPT | Performed by: INTERNAL MEDICINE

## 2024-11-05 PROCEDURE — 1036F TOBACCO NON-USER: CPT | Performed by: INTERNAL MEDICINE

## 2024-11-05 PROCEDURE — 93010 ELECTROCARDIOGRAM REPORT: CPT | Performed by: INTERNAL MEDICINE

## 2024-11-05 PROCEDURE — G2211 COMPLEX E/M VISIT ADD ON: HCPCS | Performed by: INTERNAL MEDICINE

## 2024-11-05 PROCEDURE — 3074F SYST BP LT 130 MM HG: CPT | Performed by: INTERNAL MEDICINE

## 2024-11-05 PROCEDURE — 3078F DIAST BP <80 MM HG: CPT | Performed by: INTERNAL MEDICINE

## 2024-11-05 PROCEDURE — 1159F MED LIST DOCD IN RCRD: CPT | Performed by: INTERNAL MEDICINE

## 2024-11-05 PROCEDURE — 99214 OFFICE O/P EST MOD 30 MIN: CPT | Mod: 25 | Performed by: INTERNAL MEDICINE

## 2024-11-05 PROCEDURE — 93005 ELECTROCARDIOGRAM TRACING: CPT | Performed by: INTERNAL MEDICINE

## 2024-11-05 NOTE — PROGRESS NOTES
Chief Complaint:   No chief complaint on file.     History Of Present Illness:    Max Rivera is a 64 y.o. male presenting with cardiovascular disease.    Patient denies chest pain/SOB/palpitations/dizziness/lightheadedness/edema/claudication    No regular exercise but yard work       Last Recorded Vitals:  Vitals:    11/05/24 1039 11/05/24 1125   BP: 110/60 90/56   BP Location: Left arm    Patient Position: Sitting    BP Cuff Size: Adult    Pulse: 93    SpO2: 97%    Weight: 82.6 kg (182 lb)    Height: 1.829 m (6')             Allergies:  Penicillin    Outpatient Medications:  Current Outpatient Medications   Medication Instructions    aspirin 81 mg EC tablet 1 tablet, Daily    atorvastatin (LIPITOR) 80 mg, oral, Daily    carvedilol (COREG) 3.125 mg, 2 times daily (morning and late afternoon)    cholecalciferol (VITAMIN D-3) 25 mcg, Daily    ezetimibe (ZETIA) 10 mg, oral, Daily    gabapentin (NEURONTIN) 400 mg, 3 times daily    Jardiance 10 mg, oral, Daily before breakfast    OneTouch Ultra Test strip Use as directed    sertraline (Zoloft) 100 mg tablet 1 tablet, Daily    tiZANidine (ZANAFLEX) 2 mg, Every 8 hours PRN       Physical Exam:  Constitutional:       Appearance: Healthy appearance. Not in distress.   Neck:      Vascular: No JVR. JVD normal.   Pulmonary:      Effort: Pulmonary effort is normal.      Breath sounds: Normal breath sounds. No wheezing. No rhonchi. No rales.   Chest:      Chest wall: Not tender to palpatation.   Cardiovascular:      PMI at left midclavicular line. Normal rate. Regular rhythm. Normal S1. Normal S2.       Murmurs: There is no murmur.      No gallop.  No click. No rub.   Pulses:     Intact distal pulses.   Edema:     Peripheral edema absent.   Abdominal:      General: Bowel sounds are normal.      Palpations: Abdomen is soft.      Tenderness: There is no abdominal tenderness.   Musculoskeletal: Normal range of motion.         General: No tenderness. Skin:     General: Skin is  warm and dry.   Neurological:      General: No focal deficit present.      Mental Status: Alert and oriented to person, place and time.          Last Labs:  CBC -  Lab Results   Component Value Date    WBC 9.2 10/22/2023    HGB 14.9 10/22/2023    HCT 44.9 10/22/2023    MCV 98 10/22/2023     10/22/2023       CMP -  Lab Results   Component Value Date    CALCIUM 9.2 10/22/2023    PROT 6.9 10/22/2023    ALBUMIN 3.9 10/22/2023    AST 86 (H) 10/22/2023    ALT 10 10/22/2023    ALKPHOS 69 10/22/2023    BILITOT 0.5 10/22/2023       LIPID PANEL -   Lab Results   Component Value Date    CHOL 109 04/27/2023    TRIG 94 04/27/2023    HDL 27.0 (A) 04/27/2023    CHHDL 4.0 04/27/2023    LDLF 63 04/27/2023    VLDL 19 04/27/2023   3/2024   NLJIO=807  LDL=67    RENAL FUNCTION PANEL -   Lab Results   Component Value Date    GLUCOSE 94 10/22/2023     10/22/2023    K 3.7 10/22/2023     10/22/2023    CO2 23 10/22/2023    ANIONGAP 15 10/22/2023    BUN 21 10/22/2023    CREATININE 0.84 10/22/2023    GFRMALE >90 04/27/2023    CALCIUM 9.2 10/22/2023    ALBUMIN 3.9 10/22/2023        Lab Results   Component Value Date     (H) 10/30/2018    HGBA1C 6.4 (H) 03/22/2024           Lab review: I have personally reviewed the laboratory result(s)       Problem List Items Addressed This Visit       S/P CABG x 4    Overview     10/15/2016 LIMA-LAD, SVG-D, SVG-OM, SVG-PDA   Suspect SVG-OM occluded per 9/2023 cardiac MRI results          Pure hypercholesterolemia    Overview     10/2018 lipids with LDL=75, thus added Zetia   3/2021 LDL=61   5/2023 LDL=63   3/2024 LDL=67  On high-intensity statin   Follow heart-healthy diet          Obstructive sleep apnea syndrome    Overview     Hz of sleep apnea but not wearing CPAP (recalled)  Denies day time drowsiness  Did lose weight  Also off Morphine         Ischemic dilated cardiomyopathy (Multi)    Overview     Pre CABG EF was 35%. EF=37% per 11/2018 stress test. EF 40-45% with apical  hypokinesis per 1/2019 echo.  EF improved to 50-55% per 7/2022 echo, however 3/2023 stress test with EF=32% with extensive apical MI and INFLAT MI   Subsequent 9/2023 cardiac MRI with EF=35% with ANT MI and INFLAT MI (minimal viability) - suspect patient occluded graft to OM somewhere between 2018 and 2023 stress test.  As no angina and minimal viability no cath done.  Has AICD  Patient with no s/s of CHF.   On beta blocker low dose / Jardiance - no ARB / ACE / ARNI as BP low  Recheck echo         Relevant Orders    ECG 12 lead (Clinic Performed)    Hypotension    Overview     BP still low but better off Lisinopril         CAD (coronary artery disease) - Primary    Overview     2005 ANT MI with LAD/LCX stents  S/P 10/15/2016 CABG: LIMA-LAD, SVG-D, SVG-OM, SVG-PDA  Pt with remote ANT MI with LAD / LCX stents presented to hospital 10/5/2016 with NSTEMI.  Cath with severe multivessel dz and moderate LV systolic dysfunction, thus underwent CABG.   11/2018 stress test with mildly decreased exercise tolerance and prior MI but no ischemia.  3/2023 stress test with multiple WMA / decreased EF  Based on 9/2023 cardiac MRI, suspect SVG-OM occluded  No angina.  On ASA / statin / beta blocker  Follow         AICD (automatic cardioverter/defibrillator) present    Overview     Medtronic Cobalt placed 11/2023  Follows with EP              Echo=CM    Poncho Benitez DO

## 2024-11-08 DIAGNOSIS — I25.5 ISCHEMIC DILATED CARDIOMYOPATHY (MULTI): ICD-10-CM

## 2024-11-08 DIAGNOSIS — I42.0 ISCHEMIC DILATED CARDIOMYOPATHY (MULTI): ICD-10-CM

## 2024-11-08 DIAGNOSIS — I25.10 CORONARY ARTERY DISEASE INVOLVING NATIVE CORONARY ARTERY OF NATIVE HEART, UNSPECIFIED WHETHER ANGINA PRESENT: ICD-10-CM

## 2024-11-08 RX ORDER — CARVEDILOL 3.12 MG/1
3.12 TABLET ORAL
Qty: 180 TABLET | Refills: 3 | Status: SHIPPED | OUTPATIENT
Start: 2024-11-27

## 2024-11-18 ENCOUNTER — APPOINTMENT (OUTPATIENT)
Dept: GASTROENTEROLOGY | Facility: CLINIC | Age: 64
End: 2024-11-18
Payer: MEDICARE

## 2024-11-18 VITALS
HEART RATE: 77 BPM | WEIGHT: 181 LBS | DIASTOLIC BLOOD PRESSURE: 64 MMHG | SYSTOLIC BLOOD PRESSURE: 88 MMHG | HEIGHT: 72 IN | BODY MASS INDEX: 24.52 KG/M2

## 2024-11-18 DIAGNOSIS — K76.9 HEPATIC DISORDER: ICD-10-CM

## 2024-11-18 DIAGNOSIS — R74.8 ELEVATED LIVER ENZYMES: ICD-10-CM

## 2024-11-18 DIAGNOSIS — K57.90 DIVERTICULOSIS: ICD-10-CM

## 2024-11-18 DIAGNOSIS — D12.6 COLON ADENOMA: ICD-10-CM

## 2024-11-18 DIAGNOSIS — Z11.59 ENCOUNTER FOR SCREENING FOR OTHER VIRAL DISEASES: Primary | ICD-10-CM

## 2024-11-18 DIAGNOSIS — K64.9 HEMORRHOIDS, UNSPECIFIED HEMORRHOID TYPE: ICD-10-CM

## 2024-11-18 PROCEDURE — 1159F MED LIST DOCD IN RCRD: CPT | Performed by: STUDENT IN AN ORGANIZED HEALTH CARE EDUCATION/TRAINING PROGRAM

## 2024-11-18 PROCEDURE — 99205 OFFICE O/P NEW HI 60 MIN: CPT | Performed by: STUDENT IN AN ORGANIZED HEALTH CARE EDUCATION/TRAINING PROGRAM

## 2024-11-18 PROCEDURE — 3008F BODY MASS INDEX DOCD: CPT | Performed by: STUDENT IN AN ORGANIZED HEALTH CARE EDUCATION/TRAINING PROGRAM

## 2024-11-18 PROCEDURE — 3074F SYST BP LT 130 MM HG: CPT | Performed by: STUDENT IN AN ORGANIZED HEALTH CARE EDUCATION/TRAINING PROGRAM

## 2024-11-18 PROCEDURE — 1160F RVW MEDS BY RX/DR IN RCRD: CPT | Performed by: STUDENT IN AN ORGANIZED HEALTH CARE EDUCATION/TRAINING PROGRAM

## 2024-11-18 PROCEDURE — 3078F DIAST BP <80 MM HG: CPT | Performed by: STUDENT IN AN ORGANIZED HEALTH CARE EDUCATION/TRAINING PROGRAM

## 2024-11-18 PROCEDURE — 1036F TOBACCO NON-USER: CPT | Performed by: STUDENT IN AN ORGANIZED HEALTH CARE EDUCATION/TRAINING PROGRAM

## 2024-11-18 RX ORDER — POLYETHYLENE GLYCOL 3350, SODIUM SULFATE ANHYDROUS, SODIUM BICARBONATE, SODIUM CHLORIDE, POTASSIUM CHLORIDE 236; 22.74; 6.74; 5.86; 2.97 G/4L; G/4L; G/4L; G/4L; G/4L
4 POWDER, FOR SOLUTION ORAL ONCE
Qty: 4000 ML | Refills: 0 | Status: SHIPPED | OUTPATIENT
Start: 2024-11-18 | End: 2024-11-18

## 2024-11-18 RX ORDER — ONDANSETRON HYDROCHLORIDE 2 MG/ML
4 INJECTION, SOLUTION INTRAVENOUS ONCE AS NEEDED
OUTPATIENT
Start: 2024-11-18

## 2024-11-18 NOTE — PROGRESS NOTES
64-year-old gentleman with history of CAD/CABG/AICD, HFrEF with EF 44%, dyslipidemia sleep apnea on CPAP, diabetes presenting to schedule a colonoscopy.  Patient already had 2 colonoscopies in the past as below showing colon polyps.  He mentions using marijuana after stopping opioids in the past.  He mentions history of alcohol overuse on a daily basis for 5 years, last drink 15 years ago.    EGD 11/2016 gastric biopsy unremarkable  EGD 4/2017 gastric biopsy unremarkable  Colonoscopy 12/2016 fair prep, 11 polyps, 2 C SSA, 2 AC and 3 TC Tas, 4 sig HP, sigmoid diverticulosis, internal hemorrhoids  Colonoscopy 5/2019 prep good for polyps above 6 mm, sigmoid adenoma, tortuous colon, internal hemorrhoids, repeat in 5 years  Family history reviewed, not pertinent to chief complaint  1 bowel movement every 3 days  Denies NSAIDs, prior alcohol abuse every day for 5 years, last drink 15 years ago, denies drug use smoking, positive marijuana use             The note was created using voice recognition transcription software. Despite proofreading, unintentional typographical errors may be present. Please contact the GI office with any questions or concerns.     Current Medications: reviewed    A 10 point review of system is negative except for what is mentioned in the HPI    Follow up with GI was advised       Vital Signs: Reviewed    Physical Exam:  General: no apparent distress, pleasant and cooperative  Skin:  Warm and dry, no jaundice  HEENT: No scleral icterus, no conjunctival pallor, normocephalic, atraumatic, mucous membranes moist  Neck:  atraumatic, trachea midline, no JVD  Chest:  decreased air entry to auscultation bilaterally. No wheezes, rales, or rhonchi  CV:  Regular rate and rhythm.  Positive S1/S2  Abdomen: no distension, +BS, soft, non-tender to palpation, no rebound tenderness, no guarding, no rigidity, no discernible ascites   Extremities: lower extremity edema, Chronic pigmentary changes, no  cyanosis  Neurological:  A&Ox3 , no asterixis  Psychiatric: cooperative     Investigations:  Labs, radiological imaging and cardiac work up were reviewed    1-screen for hepatitis C    2-BMI 24, healthy lifestyle advised    3-Healthcare maintenance, colonoscopy as above, repeat after 1 week of daily MiraLAX after cardiology clearance, advised to hold on marijuana the day before procedure and to be compliant with CPAP in order to prevent complications    4-elevated liver tests, unremarkable liver on CAT scan 10/2023, prior history of alcohol abuse for 5 years last drink 15 years ago, possible component of congestive hepatopathy,     CLD workup: immune to hav and hbv [otherwise negative autoimm metabolic viral], ELF 9.8 Low     5-marijuana use after stopping opioids, risks and benefits explained

## 2024-11-18 NOTE — PATIENT INSTRUCTIONS
1-we need to schedule for a colonoscopy.  We will obtain cardiology clearance.  For 1 week before your procedure please start taking MiraLAX every day to make sure that your prep is clean in addition to the gallon to drink the day before your procedure. you can mix your prep with Crystal light, use a straw and keep it in the fridge so it can taste better, drink slowly, we can send you some nausea medications so you can tolerate it better in case you need  2-please avoid marijuana the day before your procedure and please use the CPAP for the last 10 days before your procedure in order to minimize risk of complications  3-we need to check some fasting blood tests because of the elevated liver tests

## 2024-11-21 ENCOUNTER — LAB (OUTPATIENT)
Dept: LAB | Facility: LAB | Age: 64
End: 2024-11-21
Payer: MEDICARE

## 2024-11-21 DIAGNOSIS — R74.8 ELEVATED LIVER ENZYMES: ICD-10-CM

## 2024-11-21 DIAGNOSIS — K76.9 HEPATIC DISORDER: ICD-10-CM

## 2024-11-21 LAB
A1AT SERPL NEPH-MCNC: 187 MG/DL (ref 84–218)
CERULOPLASMIN SERPL-MCNC: 28.5 MG/DL (ref 20–60)
FERRITIN SERPL-MCNC: 93 NG/ML (ref 20–300)
HAV AB SER QL IA: REACTIVE
HBV CORE AB SER QL: NONREACTIVE
HBV CORE IGM SER QL: NONREACTIVE
HBV SURFACE AB SER-ACNC: 21.8 MIU/ML
HBV SURFACE AG SERPL QL IA: NONREACTIVE
HCV AB SER QL: NONREACTIVE
INR PPP: 1.1 (ref 0.9–1.1)
IRON SATN MFR SERPL: 33 % (ref 25–45)
IRON SERPL-MCNC: 122 UG/DL (ref 35–150)
PROTHROMBIN TIME: 12 SECONDS (ref 9.8–12.8)
TIBC SERPL-MCNC: 374 UG/DL (ref 240–445)
UIBC SERPL-MCNC: 252 UG/DL (ref 110–370)

## 2024-11-21 PROCEDURE — 82390 ASSAY OF CERULOPLASMIN: CPT

## 2024-11-21 PROCEDURE — 82103 ALPHA-1-ANTITRYPSIN TOTAL: CPT

## 2024-11-21 PROCEDURE — 86708 HEPATITIS A ANTIBODY: CPT

## 2024-11-21 PROCEDURE — 86705 HEP B CORE ANTIBODY IGM: CPT

## 2024-11-21 PROCEDURE — 86015 ACTIN ANTIBODY EACH: CPT

## 2024-11-21 PROCEDURE — 86803 HEPATITIS C AB TEST: CPT

## 2024-11-21 PROCEDURE — 85610 PROTHROMBIN TIME: CPT

## 2024-11-21 PROCEDURE — 82728 ASSAY OF FERRITIN: CPT

## 2024-11-21 PROCEDURE — 83540 ASSAY OF IRON: CPT

## 2024-11-21 PROCEDURE — 86706 HEP B SURFACE ANTIBODY: CPT

## 2024-11-21 PROCEDURE — 86038 ANTINUCLEAR ANTIBODIES: CPT

## 2024-11-21 PROCEDURE — 86704 HEP B CORE ANTIBODY TOTAL: CPT

## 2024-11-21 PROCEDURE — 86381 MITOCHONDRIAL ANTIBODY EACH: CPT

## 2024-11-21 PROCEDURE — 36415 COLL VENOUS BLD VENIPUNCTURE: CPT

## 2024-11-21 PROCEDURE — 83550 IRON BINDING TEST: CPT

## 2024-11-21 PROCEDURE — 87340 HEPATITIS B SURFACE AG IA: CPT

## 2024-11-21 PROCEDURE — 81517 LIVER DS ALYS 3 BMRK SRM ALG: CPT

## 2024-11-22 LAB
ANA SER QL HEP2 SUBST: NEGATIVE
MITOCHONDRIA AB SER QL IF: NEGATIVE
SMOOTH MUSCLE AB SER QL IF: NEGATIVE

## 2024-11-25 ENCOUNTER — HOSPITAL ENCOUNTER (OUTPATIENT)
Dept: CARDIOLOGY | Facility: CLINIC | Age: 64
Discharge: HOME | End: 2024-11-25
Payer: MEDICARE

## 2024-11-25 VITALS
DIASTOLIC BLOOD PRESSURE: 64 MMHG | WEIGHT: 181 LBS | BODY MASS INDEX: 24.52 KG/M2 | SYSTOLIC BLOOD PRESSURE: 88 MMHG | HEIGHT: 72 IN

## 2024-11-25 DIAGNOSIS — I42.0 ISCHEMIC DILATED CARDIOMYOPATHY (MULTI): ICD-10-CM

## 2024-11-25 DIAGNOSIS — I25.5 ISCHEMIC DILATED CARDIOMYOPATHY (MULTI): ICD-10-CM

## 2024-11-25 PROCEDURE — 93306 TTE W/DOPPLER COMPLETE: CPT

## 2024-11-25 PROCEDURE — 93306 TTE W/DOPPLER COMPLETE: CPT | Performed by: INTERNAL MEDICINE

## 2024-11-26 LAB
AORTIC VALVE MEAN GRADIENT: 3 MMHG
AORTIC VALVE PEAK VELOCITY: 1.12 M/S
AV PEAK GRADIENT: 5 MMHG
AVA (PEAK VEL): 3.78 CM2
AVA (VTI): 3.19 CM2
EJECTION FRACTION APICAL 4 CHAMBER: 49.8
EJECTION FRACTION: 53 %
LEFT ATRIUM VOLUME AREA LENGTH INDEX BSA: 18.9 ML/M2
LEFT VENTRICLE INTERNAL DIMENSION DIASTOLE: 5.68 CM (ref 3.5–6)
LEFT VENTRICULAR OUTFLOW TRACT DIAMETER: 2.21 CM
MITRAL VALVE E/A RATIO: 0.83
RIGHT VENTRICLE FREE WALL PEAK S': 0.8 CM/S
SCAN RESULT: ABNORMAL
TRICUSPID ANNULAR PLANE SYSTOLIC EXCURSION: 1.4 CM

## 2024-12-05 ENCOUNTER — HOSPITAL ENCOUNTER (OUTPATIENT)
Dept: CARDIOLOGY | Facility: CLINIC | Age: 64
Discharge: HOME | End: 2024-12-05
Payer: MEDICARE

## 2024-12-05 DIAGNOSIS — I50.20 UNSPECIFIED SYSTOLIC (CONGESTIVE) HEART FAILURE: ICD-10-CM

## 2024-12-05 DIAGNOSIS — Z95.0 PRESENCE OF CARDIAC PACEMAKER: ICD-10-CM

## 2024-12-05 PROCEDURE — 93283 PRGRMG EVAL IMPLANTABLE DFB: CPT

## 2024-12-05 PROCEDURE — 93283 PRGRMG EVAL IMPLANTABLE DFB: CPT | Performed by: INTERNAL MEDICINE

## 2025-01-04 DIAGNOSIS — I25.5 ISCHEMIC CARDIOMYOPATHY: ICD-10-CM

## 2025-01-04 DIAGNOSIS — I42.0 DILATED CARDIOMYOPATHY (MULTI): ICD-10-CM

## 2025-01-06 RX ORDER — EMPAGLIFLOZIN 10 MG/1
10 TABLET, FILM COATED ORAL
Qty: 90 TABLET | Refills: 2 | Status: SHIPPED | OUTPATIENT
Start: 2025-01-06

## 2025-01-12 DIAGNOSIS — E78.00 PURE HYPERCHOLESTEROLEMIA, UNSPECIFIED: ICD-10-CM

## 2025-01-13 RX ORDER — EZETIMIBE 10 MG/1
10 TABLET ORAL DAILY
COMMUNITY
Start: 2025-01-13

## 2025-01-16 ENCOUNTER — ANESTHESIA (OUTPATIENT)
Dept: GASTROENTEROLOGY | Facility: HOSPITAL | Age: 65
End: 2025-01-16
Payer: MEDICARE

## 2025-01-16 ENCOUNTER — HOSPITAL ENCOUNTER (OUTPATIENT)
Dept: GASTROENTEROLOGY | Facility: HOSPITAL | Age: 65
Discharge: HOME | End: 2025-01-16
Payer: MEDICARE

## 2025-01-16 ENCOUNTER — ANESTHESIA EVENT (OUTPATIENT)
Dept: GASTROENTEROLOGY | Facility: HOSPITAL | Age: 65
End: 2025-01-16
Payer: MEDICARE

## 2025-01-16 VITALS
TEMPERATURE: 98.1 F | DIASTOLIC BLOOD PRESSURE: 64 MMHG | HEART RATE: 54 BPM | OXYGEN SATURATION: 99 % | HEIGHT: 73 IN | BODY MASS INDEX: 23.59 KG/M2 | RESPIRATION RATE: 18 BRPM | WEIGHT: 178 LBS | SYSTOLIC BLOOD PRESSURE: 116 MMHG

## 2025-01-16 DIAGNOSIS — D12.6 COLON ADENOMA: ICD-10-CM

## 2025-01-16 LAB — GLUCOSE BLD MANUAL STRIP-MCNC: 78 MG/DL (ref 74–99)

## 2025-01-16 PROCEDURE — 7100000009 HC PHASE TWO TIME - INITIAL BASE CHARGE

## 2025-01-16 PROCEDURE — 2720000007 HC OR 272 NO HCPCS

## 2025-01-16 PROCEDURE — 45390 COLONOSCOPY W/RESECTION: CPT | Performed by: STUDENT IN AN ORGANIZED HEALTH CARE EDUCATION/TRAINING PROGRAM

## 2025-01-16 PROCEDURE — 3700000001 HC GENERAL ANESTHESIA TIME - INITIAL BASE CHARGE

## 2025-01-16 PROCEDURE — 7100000010 HC PHASE TWO TIME - EACH INCREMENTAL 1 MINUTE

## 2025-01-16 PROCEDURE — 45385 COLONOSCOPY W/LESION REMOVAL: CPT | Performed by: STUDENT IN AN ORGANIZED HEALTH CARE EDUCATION/TRAINING PROGRAM

## 2025-01-16 PROCEDURE — 2500000004 HC RX 250 GENERAL PHARMACY W/ HCPCS (ALT 636 FOR OP/ED): Performed by: NURSE ANESTHETIST, CERTIFIED REGISTERED

## 2025-01-16 PROCEDURE — 3700000002 HC GENERAL ANESTHESIA TIME - EACH INCREMENTAL 1 MINUTE

## 2025-01-16 PROCEDURE — 82947 ASSAY GLUCOSE BLOOD QUANT: CPT

## 2025-01-16 RX ORDER — PROPOFOL 10 MG/ML
INJECTION, EMULSION INTRAVENOUS AS NEEDED
Status: DISCONTINUED | OUTPATIENT
Start: 2025-01-16 | End: 2025-01-16

## 2025-01-16 RX ORDER — ONDANSETRON HYDROCHLORIDE 2 MG/ML
4 INJECTION, SOLUTION INTRAVENOUS ONCE AS NEEDED
Status: DISCONTINUED | OUTPATIENT
Start: 2025-01-16 | End: 2025-01-17 | Stop reason: HOSPADM

## 2025-01-16 RX ORDER — LIDOCAINE HYDROCHLORIDE 20 MG/ML
INJECTION, SOLUTION EPIDURAL; INFILTRATION; INTRACAUDAL; PERINEURAL AS NEEDED
Status: DISCONTINUED | OUTPATIENT
Start: 2025-01-16 | End: 2025-01-16

## 2025-01-16 RX ADMIN — PROPOFOL 50 MG: 10 INJECTION, EMULSION INTRAVENOUS at 13:07

## 2025-01-16 RX ADMIN — LIDOCAINE HYDROCHLORIDE 50 MG: 20 INJECTION, SOLUTION EPIDURAL; INFILTRATION; INTRACAUDAL; PERINEURAL at 12:25

## 2025-01-16 RX ADMIN — PROPOFOL 50 MG: 10 INJECTION, EMULSION INTRAVENOUS at 12:47

## 2025-01-16 RX ADMIN — PROPOFOL 50 MG: 10 INJECTION, EMULSION INTRAVENOUS at 12:40

## 2025-01-16 RX ADMIN — PROPOFOL 50 MG: 10 INJECTION, EMULSION INTRAVENOUS at 12:55

## 2025-01-16 RX ADMIN — PROPOFOL 50 MG: 10 INJECTION, EMULSION INTRAVENOUS at 13:20

## 2025-01-16 RX ADMIN — PROPOFOL 50 MG: 10 INJECTION, EMULSION INTRAVENOUS at 13:28

## 2025-01-16 RX ADMIN — PROPOFOL 50 MG: 10 INJECTION, EMULSION INTRAVENOUS at 13:33

## 2025-01-16 RX ADMIN — PROPOFOL 100 MG: 10 INJECTION, EMULSION INTRAVENOUS at 12:25

## 2025-01-16 RX ADMIN — PROPOFOL 50 MG: 10 INJECTION, EMULSION INTRAVENOUS at 13:01

## 2025-01-16 RX ADMIN — PROPOFOL 50 MG: 10 INJECTION, EMULSION INTRAVENOUS at 12:33

## 2025-01-16 RX ADMIN — PROPOFOL 50 MG: 10 INJECTION, EMULSION INTRAVENOUS at 13:14

## 2025-01-16 SDOH — HEALTH STABILITY: MENTAL HEALTH: CURRENT SMOKER: 0

## 2025-01-16 ASSESSMENT — PAIN SCALES - GENERAL
PAINLEVEL_OUTOF10: 0 - NO PAIN
PAIN_LEVEL: 0

## 2025-01-16 ASSESSMENT — COLUMBIA-SUICIDE SEVERITY RATING SCALE - C-SSRS
1. IN THE PAST MONTH, HAVE YOU WISHED YOU WERE DEAD OR WISHED YOU COULD GO TO SLEEP AND NOT WAKE UP?: NO
6. HAVE YOU EVER DONE ANYTHING, STARTED TO DO ANYTHING, OR PREPARED TO DO ANYTHING TO END YOUR LIFE?: NO
2. HAVE YOU ACTUALLY HAD ANY THOUGHTS OF KILLING YOURSELF?: NO

## 2025-01-16 ASSESSMENT — PAIN - FUNCTIONAL ASSESSMENT
PAIN_FUNCTIONAL_ASSESSMENT: 0-10
PAIN_FUNCTIONAL_ASSESSMENT: 0-10

## 2025-01-16 NOTE — H&P
Patient is here     For endoscopic procedure.        Past medical history, past surgical history, social history, family history, allergies reviewed.         The note was created using voice recognition transcription software. Despite proofreading, unintentional typographical errors may be present. Please contact the GI office with any questions or concerns.     Current Medications: reviewed    Review of system performed    Follow up with GI was advised       Vital Signs: Reviewed    Physical Exam:  General: no apparent distress  Skin:  dry  HEENT:  mucous membranes moist  Neck:  atraumatic  Chest:  decreased air entry to auscultation bilaterally  CV:  positive S1 S2   Abdomen: no distension, soft, non-tender to palpation   Extremities: Chronic pigmentary changes  Neurological:  no asterixis      Investigations:  Labs, radiological imaging and cardiac work up were reviewed      Assessment and plan:  Proceed with  endoscopic procedure    ASA reviewed   Airway reviewed

## 2025-01-16 NOTE — ANESTHESIA PREPROCEDURE EVALUATION
Patient: Max Rviera    Procedure Information       Date/Time: 01/16/25 1230    Scheduled providers: Mando Harrell MD    Procedure: COLONOSCOPY    Location: [facility]            Relevant Problems   Cardiac   (+) AICD (automatic cardioverter/defibrillator) present   (+) CAD (coronary artery disease)   (+) Chest pain   (+) PVC (premature ventricular contraction)   (+) Pure hypercholesterolemia      Neuro   (+) Carotid artery disease (CMS-HCC)   (+) Depression   (+) Lumbar radiculopathy      GI   (+) Upper gastrointestinal bleeding      Endocrine   (+) Type 2 diabetes mellitus treated without insulin (Multi)      Musculoskeletal   (+) Chronic midline low back pain without sciatica   (+) Lumbar degenerative disc disease   (+) Osteoarthritis       Clinical information reviewed:    Allergies  Meds               NPO Detail:  No data recorded     Physical Exam    Airway  Mallampati: II  TM distance: >3 FB  Neck ROM: full     Cardiovascular - normal exam  Rhythm: regular  Rate: normal     Dental - normal exam  (+) upper dentures, lower dentures     Pulmonary - normal exam     Abdominal            Anesthesia Plan    History of general anesthesia?: yes  History of complications of general anesthesia?: no    ASA 3     MAC     The patient is not a current smoker.  Education provided regarding risk of obstructive sleep apnea.  intravenous induction   Anesthetic plan and risks discussed with patient.    Plan discussed with CRNA.

## 2025-01-16 NOTE — ANESTHESIA POSTPROCEDURE EVALUATION
Patient: Max Rivera    Procedure Summary       Date: 01/16/25 Room / Location: [facility]    Anesthesia Start: 1220 Anesthesia Stop: 1343    Procedure: COLONOSCOPY Diagnosis: Colon adenoma    Scheduled Providers: Mando Harrell MD Responsible Provider: Charanjit Logan MD    Anesthesia Type: MAC ASA Status: 3            Anesthesia Type: MAC    Vitals Value Taken Time   BP 93/52 01/16/25 1345   Temp 36.4 01/16/25 1347   Pulse 49 01/16/25 1346   Resp 18 01/16/25 1347   SpO2 95 % 01/16/25 1346   Vitals shown include unfiled device data.    Anesthesia Post Evaluation    Patient location during evaluation: PACU  Patient participation: waiting for patient participation  Level of consciousness: awake  Pain score: 0  Pain management: adequate  Airway patency: patent  Cardiovascular status: acceptable and hemodynamically stable  Respiratory status: acceptable, nasal cannula and oral airway  Hydration status: acceptable  Postoperative Nausea and Vomiting: none        No notable events documented.

## 2025-01-16 NOTE — DISCHARGE INSTRUCTIONS

## 2025-01-23 LAB
LABORATORY COMMENT REPORT: NORMAL
PATH REPORT.FINAL DX SPEC: NORMAL
PATH REPORT.GROSS SPEC: NORMAL
PATH REPORT.RELEVANT HX SPEC: NORMAL
PATH REPORT.TOTAL CANCER: NORMAL

## 2025-02-03 DIAGNOSIS — E78.00 PURE HYPERCHOLESTEROLEMIA, UNSPECIFIED: ICD-10-CM

## 2025-02-03 RX ORDER — EZETIMIBE 10 MG/1
10 TABLET ORAL DAILY
COMMUNITY
Start: 2025-02-03

## 2025-03-06 ENCOUNTER — HOSPITAL ENCOUNTER (OUTPATIENT)
Dept: CARDIOLOGY | Facility: CLINIC | Age: 65
Discharge: HOME | End: 2025-03-06
Payer: MEDICARE

## 2025-03-06 DIAGNOSIS — I47.20 VT (VENTRICULAR TACHYCARDIA) (MULTI): ICD-10-CM

## 2025-03-06 DIAGNOSIS — Z95.810 AICD (AUTOMATIC CARDIOVERTER/DEFIBRILLATOR) PRESENT: ICD-10-CM

## 2025-03-06 PROCEDURE — 93296 REM INTERROG EVL PM/IDS: CPT

## 2025-03-21 DIAGNOSIS — E78.00 PURE HYPERCHOLESTEROLEMIA, UNSPECIFIED: ICD-10-CM

## 2025-03-21 RX ORDER — EZETIMIBE 10 MG/1
10 TABLET ORAL DAILY
Qty: 90 TABLET | Refills: 1 | Status: SHIPPED | OUTPATIENT
Start: 2025-03-21

## 2025-04-23 DIAGNOSIS — I25.10 CORONARY ARTERY DISEASE INVOLVING NATIVE CORONARY ARTERY OF NATIVE HEART, UNSPECIFIED WHETHER ANGINA PRESENT: ICD-10-CM

## 2025-04-23 DIAGNOSIS — I77.9 CAROTID ARTERY DISEASE, UNSPECIFIED LATERALITY, UNSPECIFIED TYPE: ICD-10-CM

## 2025-04-23 DIAGNOSIS — E78.00 PURE HYPERCHOLESTEROLEMIA, UNSPECIFIED: ICD-10-CM

## 2025-04-24 RX ORDER — ATORVASTATIN CALCIUM 80 MG/1
80 TABLET, FILM COATED ORAL DAILY
Qty: 90 TABLET | Refills: 1 | Status: SHIPPED | OUTPATIENT
Start: 2025-04-24

## 2025-05-07 ENCOUNTER — OFFICE VISIT (OUTPATIENT)
Dept: CARDIOLOGY | Facility: CLINIC | Age: 65
End: 2025-05-07
Payer: MEDICARE

## 2025-05-07 VITALS
BODY MASS INDEX: 24.01 KG/M2 | SYSTOLIC BLOOD PRESSURE: 90 MMHG | WEIGHT: 182 LBS | OXYGEN SATURATION: 91 % | HEART RATE: 84 BPM | DIASTOLIC BLOOD PRESSURE: 60 MMHG

## 2025-05-07 DIAGNOSIS — I77.9 CAROTID ARTERY DISEASE, UNSPECIFIED LATERALITY, UNSPECIFIED TYPE: Primary | ICD-10-CM

## 2025-05-07 DIAGNOSIS — I25.5 ISCHEMIC DILATED CARDIOMYOPATHY (MULTI): ICD-10-CM

## 2025-05-07 DIAGNOSIS — G47.33 OBSTRUCTIVE SLEEP APNEA SYNDROME: ICD-10-CM

## 2025-05-07 DIAGNOSIS — I42.0 ISCHEMIC DILATED CARDIOMYOPATHY (MULTI): ICD-10-CM

## 2025-05-07 DIAGNOSIS — E78.00 PURE HYPERCHOLESTEROLEMIA: ICD-10-CM

## 2025-05-07 DIAGNOSIS — Z95.810 AICD (AUTOMATIC CARDIOVERTER/DEFIBRILLATOR) PRESENT: ICD-10-CM

## 2025-05-07 DIAGNOSIS — I95.9 HYPOTENSION, UNSPECIFIED HYPOTENSION TYPE: ICD-10-CM

## 2025-05-07 DIAGNOSIS — Z95.1 S/P CABG X 4: ICD-10-CM

## 2025-05-07 DIAGNOSIS — I25.10 CORONARY ARTERY DISEASE INVOLVING NATIVE CORONARY ARTERY OF NATIVE HEART, UNSPECIFIED WHETHER ANGINA PRESENT: ICD-10-CM

## 2025-05-07 PROCEDURE — 3074F SYST BP LT 130 MM HG: CPT | Performed by: INTERNAL MEDICINE

## 2025-05-07 PROCEDURE — 3078F DIAST BP <80 MM HG: CPT | Performed by: INTERNAL MEDICINE

## 2025-05-07 PROCEDURE — 1159F MED LIST DOCD IN RCRD: CPT | Performed by: INTERNAL MEDICINE

## 2025-05-07 PROCEDURE — 1036F TOBACCO NON-USER: CPT | Performed by: INTERNAL MEDICINE

## 2025-05-07 PROCEDURE — 99212 OFFICE O/P EST SF 10 MIN: CPT | Performed by: INTERNAL MEDICINE

## 2025-05-07 PROCEDURE — 99213 OFFICE O/P EST LOW 20 MIN: CPT | Performed by: INTERNAL MEDICINE

## 2025-05-07 PROCEDURE — 1160F RVW MEDS BY RX/DR IN RCRD: CPT | Performed by: INTERNAL MEDICINE

## 2025-05-07 PROCEDURE — G2211 COMPLEX E/M VISIT ADD ON: HCPCS | Performed by: INTERNAL MEDICINE

## 2025-05-07 PROCEDURE — 99214 OFFICE O/P EST MOD 30 MIN: CPT | Performed by: INTERNAL MEDICINE

## 2025-05-07 NOTE — PROGRESS NOTES
With 4Chief Complaint:   Follow-up, Coronary Artery Disease, Cardiomyopathy, PVC, and Heart Failure     History Of Present Illness:    Max Rivera is a 64 y.o. male presenting with cardiovascular disease.  Had echo and blood work since last visit    Patient denies chest pain/SOB/palpitations/dizziness/lightheadedness/edema/claudication    No regular exercise but yard work/walks       Last Recorded Vitals:  Vitals:    05/07/25 0959   BP: 90/60   BP Location: Right arm   Patient Position: Sitting   BP Cuff Size: Adult   Pulse: 84   SpO2: 91%   Weight: 82.6 kg (182 lb)            Allergies:  Penicillin    Outpatient Medications:  Current Outpatient Medications   Medication Instructions    aspirin 81 mg EC tablet 1 tablet, Daily    atorvastatin (LIPITOR) 80 mg, oral, Daily    carvedilol (COREG) 3.125 mg, oral, 2 times daily (morning and late afternoon)    cholecalciferol (VITAMIN D-3) 25 mcg, Daily    ezetimibe (ZETIA) 10 mg, oral, Daily    gabapentin (NEURONTIN) 400 mg, 3 times daily    Jardiance 10 mg, oral, Daily before breakfast    OneTouch Ultra Test strip Use as directed    sertraline (Zoloft) 100 mg tablet 1 tablet, Daily    tiZANidine (ZANAFLEX) 2 mg, Every 8 hours PRN       Physical Exam:  Constitutional:       Appearance: Healthy appearance. Not in distress.   Neck:      Vascular: No JVR. JVD normal.   Pulmonary:      Effort: Pulmonary effort is normal.      Breath sounds: Normal breath sounds. No wheezing. No rhonchi. No rales.   Chest:      Chest wall: Not tender to palpatation.   Cardiovascular:      PMI at left midclavicular line. Normal rate. Regular rhythm. Normal S1. Normal S2.       Murmurs: There is no murmur.      No gallop.  No click. No rub.   Pulses:     Intact distal pulses.   Edema:     Peripheral edema absent.   Abdominal:      General: Bowel sounds are normal.      Palpations: Abdomen is soft.      Tenderness: There is no abdominal tenderness.   Musculoskeletal: Normal range of motion.          General: No tenderness. Skin:     General: Skin is warm and dry.   Neurological:      General: No focal deficit present.      Mental Status: Alert and oriented to person, place and time.          Last Labs:  CBC -  Lab Results   Component Value Date    WBC 9.2 10/22/2023    HGB 14.9 10/22/2023    HCT 44.9 10/22/2023    MCV 98 10/22/2023     10/22/2023       CMP -  Lab Results   Component Value Date    CALCIUM 9.2 10/22/2023    PROT 6.9 10/22/2023    ALBUMIN 3.9 10/22/2023    AST 86 (H) 10/22/2023    ALT 10 10/22/2023    ALKPHOS 69 10/22/2023    BILITOT 0.5 10/22/2023       LIPID PANEL -   Lab Results   Component Value Date    CHOL 109 04/27/2023    TRIG 94 04/27/2023    HDL 27.0 (A) 04/27/2023    CHHDL 4.0 04/27/2023    LDLF 63 04/27/2023    VLDL 19 04/27/2023   3/2025  AWAWZ=653  LDL=69    RENAL FUNCTION PANEL -   Lab Results   Component Value Date    GLUCOSE 94 10/22/2023     10/22/2023    K 3.7 10/22/2023     10/22/2023    CO2 23 10/22/2023    ANIONGAP 15 10/22/2023    BUN 21 10/22/2023    CREATININE 0.84 10/22/2023    GFRMALE >90 04/27/2023    CALCIUM 9.2 10/22/2023    ALBUMIN 3.9 10/22/2023    4/2025  Cr .85  K 4.5    Lab Results   Component Value Date     (H) 10/30/2018    HGBA1C 6.2 (H) 04/14/2025           Lab review: I have personally reviewed the laboratory result(s)       Problem List Items Addressed This Visit       Carotid artery disease - Primary    Overview   Stable moderate right carotid dz and mild left dz per 4/2022 duplex   On ASA / statin   9/2023 duplex with Less than 50% stenosis B/L  -no bruit on exam         S/P CABG x 4    Overview   10/15/2016 LIMA-LAD, SVG-D, SVG-OM, SVG-PDA   Suspect SVG-OM occluded per 9/2023 cardiac MRI results          Pure hypercholesterolemia    Overview   10/2018 lipids with LDL=75, thus added Zetia   3/2021 LDL=61   5/2023 LDL=63   3/2024 LDL=67  4/2025 LDL=69  On high-intensity statin   Follow heart-healthy diet          Obstructive  sleep apnea syndrome    Overview   Hz of sleep apnea but not wearing CPAP (recalled)  Denies day time drowsiness  Did lose weight  Also off Morphine         Ischemic dilated cardiomyopathy (Multi)    Overview   Pre CABG EF was 35%. EF=37% per 11/2018 stress test. EF 40-45% with apical hypokinesis per 1/2019 echo.  EF improved to 50-55% per 7/2022 echo, however 3/2023 stress test with EF=32% with extensive apical MI and INFLAT MI   Subsequent 9/2023 cardiac MRI with EF=35% with ANT MI and INFLAT MI (minimal viability) - suspect patient occluded graft to OM somewhere between 2018 and 2023 stress test.  As no angina and minimal viability no cath done.  11/2024 echo with EF=50-55%  Has AICD  Patient with no s/s of CHF.   On beta blocker low dose / Jardiance - no ARB / ACE / ARNI as BP low           Hypotension    Overview   BP still low but better off Lisinopril         CAD (coronary artery disease)    Overview   2005 ANT MI with LAD/LCX stents  S/P 10/15/2016 CABG: LIMA-LAD, SVG-D, SVG-OM, SVG-PDA  Pt with remote ANT MI with LAD / LCX stents presented to hospital 10/5/2016 with NSTEMI.  Cath with severe multivessel dz and moderate LV systolic dysfunction, thus underwent CABG.   11/2018 stress test with mildly decreased exercise tolerance and prior MI but no ischemia.  3/2023 stress test with multiple WMA / decreased EF  Based on 9/2023 cardiac MRI, suspect SVG-OM occluded  No angina.  On ASA / statin / beta blocker  Follow         AICD (automatic cardioverter/defibrillator) present    Overview   Medtronic Cobalt placed 11/2023  Follows with KVNG Benitez DO

## 2025-06-05 ENCOUNTER — HOSPITAL ENCOUNTER (OUTPATIENT)
Dept: CARDIOLOGY | Facility: CLINIC | Age: 65
Discharge: HOME | End: 2025-06-05
Payer: MEDICARE

## 2025-06-05 DIAGNOSIS — Z95.810 AICD (AUTOMATIC CARDIOVERTER/DEFIBRILLATOR) PRESENT: ICD-10-CM

## 2025-06-05 DIAGNOSIS — I47.20 VT (VENTRICULAR TACHYCARDIA) (MULTI): ICD-10-CM

## 2025-06-05 PROCEDURE — 93296 REM INTERROG EVL PM/IDS: CPT

## 2025-06-05 PROCEDURE — 93295 DEV INTERROG REMOTE 1/2/MLT: CPT | Performed by: INTERNAL MEDICINE

## 2025-08-06 DIAGNOSIS — E78.00 PURE HYPERCHOLESTEROLEMIA, UNSPECIFIED: ICD-10-CM

## 2025-08-06 DIAGNOSIS — I77.9 CAROTID ARTERY DISEASE, UNSPECIFIED LATERALITY, UNSPECIFIED TYPE: ICD-10-CM

## 2025-08-06 DIAGNOSIS — I42.0 DILATED CARDIOMYOPATHY (MULTI): ICD-10-CM

## 2025-08-06 DIAGNOSIS — I25.10 CORONARY ARTERY DISEASE INVOLVING NATIVE CORONARY ARTERY OF NATIVE HEART, UNSPECIFIED WHETHER ANGINA PRESENT: ICD-10-CM

## 2025-08-06 DIAGNOSIS — I25.5 ISCHEMIC CARDIOMYOPATHY: ICD-10-CM

## 2025-08-06 RX ORDER — EZETIMIBE 10 MG/1
10 TABLET ORAL DAILY
COMMUNITY
Start: 2025-08-06

## 2025-08-06 RX ORDER — EMPAGLIFLOZIN 10 MG/1
10 TABLET, FILM COATED ORAL
COMMUNITY
Start: 2025-08-06

## 2025-08-06 RX ORDER — ATORVASTATIN CALCIUM 80 MG/1
80 TABLET, FILM COATED ORAL DAILY
COMMUNITY
Start: 2025-08-06

## 2025-09-04 ENCOUNTER — HOSPITAL ENCOUNTER (OUTPATIENT)
Dept: CARDIOLOGY | Facility: CLINIC | Age: 65
Discharge: HOME | End: 2025-09-04
Payer: MEDICARE

## 2025-09-04 DIAGNOSIS — I47.20 VT (VENTRICULAR TACHYCARDIA) (MULTI): ICD-10-CM

## 2025-09-04 DIAGNOSIS — Z95.810 AICD (AUTOMATIC CARDIOVERTER/DEFIBRILLATOR) PRESENT: ICD-10-CM

## 2025-09-04 PROCEDURE — 93296 REM INTERROG EVL PM/IDS: CPT

## (undated) DEVICE — 30IN POLYSORB SUTURE, 4-0, UNDYED, V-20 NEEDLE, TAPER PT

## (undated) DEVICE — PAD, GROUNDING, ELECTROSURGICAL, W/9 FT CABLE, POLYHESIVE II, ADULT, LF

## (undated) DEVICE — CABLE, SURGICAL, SM CLIP

## (undated) DEVICE — Device

## (undated) DEVICE — INTRODUCER SYSTEM, PRELUDE SNAP, SPLITTABLE, HEMOSTATIC, 7FR

## (undated) DEVICE — ACCESS KIT, MINI MAK, 4FR X 10CML, 0.018 X 40CM, SS/SS, ECHO ENHANCED 7CM NDL

## (undated) DEVICE — INTRODUCER SYSTEM, PRELUDE SNAP, SPLITTABLE, 9 FR X 13 CM

## (undated) DEVICE — SUTURE, ETHIBOND, XTRA, 30 IN, 0, CT-2, GREEN

## (undated) DEVICE — ENVELOPE, ANTIBACTERIAL, AIGIS RX TYRX, ABSORBABLE, LRG

## (undated) DEVICE — WOUND SYSTEM, DEBRIDEMENT & CLEANING, O.R DUOPAK

## (undated) DEVICE — DRESSING, MEPILEX BORDER, POST-OP AG, 4 X 8 IN